# Patient Record
Sex: MALE | Race: OTHER | HISPANIC OR LATINO | Employment: FULL TIME | URBAN - METROPOLITAN AREA
[De-identification: names, ages, dates, MRNs, and addresses within clinical notes are randomized per-mention and may not be internally consistent; named-entity substitution may affect disease eponyms.]

---

## 2019-08-12 ENCOUNTER — HOSPITAL ENCOUNTER (EMERGENCY)
Facility: HOSPITAL | Age: 24
Discharge: HOME | End: 2019-08-12
Attending: EMERGENCY MEDICINE
Payer: COMMERCIAL

## 2019-08-12 ENCOUNTER — APPOINTMENT (EMERGENCY)
Dept: RADIOLOGY | Facility: HOSPITAL | Age: 24
End: 2019-08-12
Payer: COMMERCIAL

## 2019-08-12 VITALS
WEIGHT: 251.77 LBS | SYSTOLIC BLOOD PRESSURE: 147 MMHG | HEART RATE: 97 BPM | DIASTOLIC BLOOD PRESSURE: 77 MMHG | TEMPERATURE: 98.7 F | RESPIRATION RATE: 20 BRPM | OXYGEN SATURATION: 99 %

## 2019-08-12 DIAGNOSIS — S61.419A LACERATION OF HAND: Primary | ICD-10-CM

## 2019-08-12 PROCEDURE — 90471 IMMUNIZATION ADMIN: CPT

## 2019-08-12 PROCEDURE — 99284 EMERGENCY DEPT VISIT MOD MDM: CPT | Performed by: PHYSICIAN ASSISTANT

## 2019-08-12 PROCEDURE — 13132 CMPLX RPR F/C/C/M/N/AX/G/H/F: CPT | Performed by: PHYSICIAN ASSISTANT

## 2019-08-12 PROCEDURE — 90715 TDAP VACCINE 7 YRS/> IM: CPT | Performed by: PHYSICIAN ASSISTANT

## 2019-08-12 PROCEDURE — 73130 X-RAY EXAM OF HAND: CPT

## 2019-08-12 PROCEDURE — 99283 EMERGENCY DEPT VISIT LOW MDM: CPT

## 2019-08-12 PROCEDURE — 12002 RPR S/N/AX/GEN/TRNK2.6-7.5CM: CPT | Performed by: PHYSICIAN ASSISTANT

## 2019-08-12 RX ORDER — GINSENG 100 MG
1 CAPSULE ORAL ONCE
Status: COMPLETED | OUTPATIENT
Start: 2019-08-12 | End: 2019-08-12

## 2019-08-12 RX ORDER — AMOXICILLIN AND CLAVULANATE POTASSIUM 875; 125 MG/1; MG/1
1 TABLET, FILM COATED ORAL ONCE
Status: COMPLETED | OUTPATIENT
Start: 2019-08-12 | End: 2019-08-12

## 2019-08-12 RX ORDER — LIDOCAINE HYDROCHLORIDE 20 MG/ML
10 INJECTION, SOLUTION EPIDURAL; INFILTRATION; INTRACAUDAL; PERINEURAL ONCE
Status: COMPLETED | OUTPATIENT
Start: 2019-08-12 | End: 2019-08-12

## 2019-08-12 RX ORDER — AMOXICILLIN AND CLAVULANATE POTASSIUM 875; 125 MG/1; MG/1
1 TABLET, FILM COATED ORAL EVERY 12 HOURS SCHEDULED
Qty: 14 TABLET | Refills: 0 | Status: SHIPPED | OUTPATIENT
Start: 2019-08-12 | End: 2019-08-19

## 2019-08-12 RX ADMIN — BACITRACIN 1 LARGE APPLICATION: 500 OINTMENT TOPICAL at 21:49

## 2019-08-12 RX ADMIN — LIDOCAINE HYDROCHLORIDE 10 ML: 20 INJECTION, SOLUTION EPIDURAL; INFILTRATION; INTRACAUDAL; PERINEURAL at 19:59

## 2019-08-12 RX ADMIN — TETANUS TOXOID, REDUCED DIPHTHERIA TOXOID AND ACELLULAR PERTUSSIS VACCINE, ADSORBED 0.5 ML: 5; 2.5; 8; 8; 2.5 SUSPENSION INTRAMUSCULAR at 20:00

## 2019-08-12 RX ADMIN — AMOXICILLIN AND CLAVULANATE POTASSIUM 1 TABLET: 875; 125 TABLET, FILM COATED ORAL at 22:00

## 2019-08-13 NOTE — ED PROVIDER NOTES
History  Chief Complaint   Patient presents with    Hand Laceration     R hand laceration  This is a 24 y/o M with no known PMHx who presents today s/p injury with sustained multiple lacerations to his R hand  The patient reports he was angry and smashed a glass cup  He reports he is not sure if he is up to date with his tetanus shot  He has no known allergies  He reports he has trouble gripping with his 3rd and 4th phalanx and trouble flexing with these fingers  He has no decreased sensation  No cyanosis on exam        History provided by:  Patient  Laceration   Location:  Hand  Hand laceration location:  R hand and R fingers  Length:  4cm laceration to his 3rd proximal phalanx, 3cm laceration to his 2nd proximal phalanx, and an avulsion laceration to 4th phalanx with multiple small abrasions  Depth: Through underlying tissue  Quality: avulsion and straight    Bleeding: arterial and controlled    Time since incident:  2 hours  Laceration mechanism:  Broken glass  Pain details:     Quality:  Aching    Severity:  Mild    Timing:  Constant    Progression:  Worsening  Foreign body present:  No foreign bodies  Relieved by:  None tried  Worsened by: Movement and pressure  Ineffective treatments:  None tried  Tetanus status:  Out of date  Associated symptoms: focal weakness    Associated symptoms: no fever, no numbness, no rash, no redness, no swelling and no streaking        None       History reviewed  No pertinent past medical history  History reviewed  No pertinent surgical history  History reviewed  No pertinent family history  I have reviewed and agree with the history as documented  Social History     Tobacco Use    Smoking status: Current Every Day Smoker    Smokeless tobacco: Never Used   Substance Use Topics    Alcohol use: Yes     Comment: social    Drug use: No        Review of Systems   Constitutional: Negative  Negative for fever  HENT: Negative  Eyes: Negative      Respiratory: Negative  Cardiovascular: Negative  Gastrointestinal: Negative  Endocrine: Negative  Genitourinary: Negative  Musculoskeletal: Negative  Skin: Positive for wound  Negative for rash  Allergic/Immunologic: Negative  Neurological: Positive for focal weakness  Hematological: Negative  Psychiatric/Behavioral: Negative  Physical Exam  Physical Exam   Constitutional: He appears well-developed and well-nourished  Cardiovascular: Normal rate and regular rhythm  Pulmonary/Chest: Effort normal and breath sounds normal    Musculoskeletal:        Hands:  Noted deep 4cm laceration of 3rd proximal phalanx with superficial arterial bleeding present  Noted decreased ability to flex finger on exam  No cyanosis or distal neuropathy present on exam    4th distal phalanx 3cm avulsion laceration  Noted decreased flexion present of finger  No distal cyanosis or neuropathy    Noted 2nd proximal phalanx 3cm straight laceration  No distal neuropathy  Noted normal strength  No distal cyanosis      Multiple superficial abrasions present on palm       Vital Signs  ED Triage Vitals   Temperature Pulse Respirations Blood Pressure SpO2   08/12/19 1935 08/12/19 1930 08/12/19 1930 08/12/19 1930 08/12/19 1930   98 7 °F (37 1 °C) 97 20 147/77 99 %      Temp Source Heart Rate Source Patient Position - Orthostatic VS BP Location FiO2 (%)   08/12/19 1935 08/12/19 1930 08/12/19 1930 08/12/19 1930 --   Tympanic Monitor Sitting Left arm       Pain Score       08/12/19 1940       9           Vitals:    08/12/19 1930   BP: 147/77   Pulse: 97   Patient Position - Orthostatic VS: Sitting         Visual Acuity      ED Medications  Medications   tetanus-diphtheria-acellular pertussis (BOOSTRIX) IM injection 0 5 mL (0 5 mL Intramuscular Given 8/12/19 2000)   lidocaine (PF) (XYLOCAINE-MPF) 2 % injection 10 mL (10 mL Infiltration Given 8/12/19 1959)   bacitracin topical ointment 1 large application (1 large application Topical Given 8/12/19 2147)   amoxicillin-clavulanate (AUGMENTIN) 875-125 mg per tablet 1 tablet (1 tablet Oral Given 8/12/19 2200)       Diagnostic Studies  Results Reviewed     None                 XR hand 3+ views RIGHT   ED Interpretation by Nohemi Loo PA-C (08/12 2017)   No radio opaque FB present on XR      Final Result by Keagan Eller MD (08/13 7343)      No acute osseous abnormality  Workstation performed: [id number]                    Procedures  Laceration repair  Date/Time: 8/12/2019 8:00 PM  Performed by: Nohemi Loo PA-C  Authorized by: Nohemi Loo PA-C   Consent: Verbal consent obtained  Written consent not obtained  Consent given by: patient  Patient understanding: patient states understanding of the procedure being performed  Patient consent: the patient's understanding of the procedure matches consent given  Patient identity confirmed: verbally with patient  Body area: upper extremity  Location details: right index finger  Laceration length: 3 cm  Foreign bodies: no foreign bodies  Tendon involvement: none  Nerve involvement: none  Vascular damage: no  Anesthesia: digital block    Anesthesia:  Local Anesthetic: lidocaine 2% without epinephrine  Anesthetic total: 2 mL    Sedation:  Patient sedated: no      Wound Dehiscence:  Superficial Wound Dehiscence: simple closure      Procedure Details:  Irrigation solution: saline  Irrigation method: jet lavage  Debridement: none  Skin closure: Ethilon  Number of sutures: 2  Technique: simple  Approximation difficulty: simple  Dressing: antibiotic ointment    Laceration repair  Date/Time: 8/12/2019 8:00 PM  Performed by: Nohemi Loo PA-C  Authorized by: Nohemi Loo PA-C   Consent: Verbal consent obtained    Consent given by: patient  Patient understanding: patient states understanding of the procedure being performed  Patient consent: the patient's understanding of the procedure matches consent given  Patient identity confirmed: verbally with patient  Body area: upper extremity  Location details: right long finger  Laceration length: 4 cm  Foreign bodies: no foreign bodies  Tendon involvement: superficial  Nerve involvement: none  Vascular damage: yes  Anesthesia: local infiltration    Anesthesia:  Local Anesthetic: lidocaine 2% without epinephrine  Anesthetic total: 5 mL    Sedation:  Patient sedated: no      Wound Dehiscence:  Superficial Wound Dehiscence: simple closure      Procedure Details:  Irrigation solution: saline  Irrigation method: jet lavage  Amount of cleaning: standard  Debridement: none  Skin closure: Ethilon  Number of sutures: 8  Technique: simple  Approximation: close  Approximation difficulty: complex  Dressing: antibiotic ointment    Laceration repair  Date/Time: 8/12/2019 8:00 PM  Performed by: Ilia Garcia PA-C  Authorized by: Ilia Garcia PA-C   Consent: Verbal consent obtained    Consent given by: patient  Patient understanding: patient states understanding of the procedure being performed  Patient consent: the patient's understanding of the procedure matches consent given  Patient identity confirmed: verbally with patient  Body area: upper extremity  Location details: right ring finger  Laceration length: 3 cm  Foreign bodies: no foreign bodies  Tendon involvement: complex  Nerve involvement: complex  Vascular damage: no  Anesthesia: digital block    Anesthesia:  Local Anesthetic: lidocaine 2% without epinephrine  Anesthetic total: 2 mL    Sedation:  Patient sedated: no      Wound Dehiscence:  Superficial Wound Dehiscence: simple closure      Procedure Details:  Irrigation solution: saline  Amount of cleaning: standard  Debridement: none  Degree of undermining: none  Skin closure: Ethilon  Number of sutures: 3  Technique: simple  Approximation: close  Approximation difficulty: simple  Dressing: antibiotic ointment and 4x4 sterile gauze             ED Course MDM  Number of Diagnoses or Management Options  Laceration of hand:   Diagnosis management comments: This is a 26 y/o M who presents today with sustained multiple lacerations on his R hand  His XR was negative for any FB or fracture  He underwent laceration repair and tolerated procedure well - total of 13 sutures placed to 3 wounds  Wounds were irrigated and cleaned with betadine  He had multiple superficial abrasions which were treated with antibiotic ointment and dressed with the 3 lacerations  He was given augmentin to take for home  Given his decreased ability to flex his 3rd and 4th phalanx, Dr Angela Shultz was called  He recommended the patient report to his office at noon the following day  I stressed this to the patient  He understood -  was used during the entirety of the visit  Patient was told sutures should be removed in 7 days  Disposition  Final diagnoses:   Laceration of hand     Time reflects when diagnosis was documented in both MDM as applicable and the Disposition within this note     Time User Action Codes Description Comment    8/12/2019  9:58 PM Cheo Bhagat Add [U37 736C] Laceration of hand       ED Disposition     ED Disposition Condition Date/Time Comment    Discharge Stable Mon Aug 12, 2019  9:50 PM Paula Smallwood discharge to home/self care  Follow-up Information     Follow up With Specialties Details Why [address], MD Plastic Surgery  Please go to this address at noon tomorrow [address] [place] [address]  [phone]            Discharge Medication List as of 8/12/2019  9:59 PM      START taking these medications    Details   amoxicillin-clavulanate (AUGMENTIN) 875-125 mg per tablet Take 1 tablet by mouth every 12 (twelve) hours for 7 days, Starting Mon 8/12/2019, Until Mon 8/19/2019, Print           No discharge procedures on file      ED Provider  Electronically Signed by           Roderick Crawley Yane Gibbons PA-C  08/13/19 [address] [facility] Yane Gibbons PA-C  08/13/19 0186

## 2019-08-17 ENCOUNTER — TRANSCRIBE ORDERS (OUTPATIENT)
Dept: ADMINISTRATIVE | Facility: HOSPITAL | Age: 24
End: 2019-08-17

## 2019-08-17 ENCOUNTER — APPOINTMENT (OUTPATIENT)
Dept: LAB | Facility: HOSPITAL | Age: 24
End: 2019-08-17
Attending: PLASTIC SURGERY
Payer: COMMERCIAL

## 2019-08-17 DIAGNOSIS — S66.195A OTHER INJURY OF FLEXOR MUSCLE, FASCIA AND TENDON OF LEFT RING FINGER AT WRIST AND HAND LEVEL, INITIAL ENCOUNTER: Primary | ICD-10-CM

## 2019-08-17 DIAGNOSIS — S66.195A OTHER INJURY OF FLEXOR MUSCLE, FASCIA AND TENDON OF LEFT RING FINGER AT WRIST AND HAND LEVEL, INITIAL ENCOUNTER: ICD-10-CM

## 2019-08-17 LAB
ANION GAP SERPL CALCULATED.3IONS-SCNC: 10 MMOL/L (ref 4–13)
BASOPHILS # BLD AUTO: 0.04 THOUSANDS/ΜL (ref 0–0.1)
BASOPHILS NFR BLD AUTO: 1 % (ref 0–1)
BUN SERPL-MCNC: 11 MG/DL (ref 5–25)
CALCIUM SERPL-MCNC: 9.5 MG/DL (ref 8.3–10.1)
CHLORIDE SERPL-SCNC: 103 MMOL/L (ref 100–108)
CO2 SERPL-SCNC: 26 MMOL/L (ref 21–32)
CREAT SERPL-MCNC: 0.87 MG/DL (ref 0.6–1.3)
EOSINOPHIL # BLD AUTO: 0.09 THOUSAND/ΜL (ref 0–0.61)
EOSINOPHIL NFR BLD AUTO: 1 % (ref 0–6)
ERYTHROCYTE [DISTWIDTH] IN BLOOD BY AUTOMATED COUNT: 13.9 % (ref 11.6–15.1)
ERYTHROCYTE [SEDIMENTATION RATE] IN BLOOD: 7 MM/HOUR (ref 0–10)
GFR SERPL CREATININE-BSD FRML MDRD: 121 ML/MIN/1.73SQ M
GLUCOSE P FAST SERPL-MCNC: 93 MG/DL (ref 65–99)
HCT VFR BLD AUTO: 43.6 % (ref 36.5–49.3)
HGB BLD-MCNC: 13.4 G/DL (ref 12–17)
IMM GRANULOCYTES # BLD AUTO: 0.03 THOUSAND/UL (ref 0–0.2)
IMM GRANULOCYTES NFR BLD AUTO: 0 % (ref 0–2)
LYMPHOCYTES # BLD AUTO: 1.83 THOUSANDS/ΜL (ref 0.6–4.47)
LYMPHOCYTES NFR BLD AUTO: 27 % (ref 14–44)
MCH RBC QN AUTO: 25.7 PG (ref 26.8–34.3)
MCHC RBC AUTO-ENTMCNC: 30.7 G/DL (ref 31.4–37.4)
MCV RBC AUTO: 84 FL (ref 82–98)
MONOCYTES # BLD AUTO: 0.45 THOUSAND/ΜL (ref 0.17–1.22)
MONOCYTES NFR BLD AUTO: 7 % (ref 4–12)
NEUTROPHILS # BLD AUTO: 4.33 THOUSANDS/ΜL (ref 1.85–7.62)
NEUTS SEG NFR BLD AUTO: 64 % (ref 43–75)
NRBC BLD AUTO-RTO: 0 /100 WBCS
PLATELET # BLD AUTO: 213 THOUSANDS/UL (ref 149–390)
PMV BLD AUTO: 11.6 FL (ref 8.9–12.7)
POTASSIUM SERPL-SCNC: 4.3 MMOL/L (ref 3.5–5.3)
RBC # BLD AUTO: 5.21 MILLION/UL (ref 3.88–5.62)
SODIUM SERPL-SCNC: 139 MMOL/L (ref 136–145)
WBC # BLD AUTO: 6.77 THOUSAND/UL (ref 4.31–10.16)

## 2019-08-17 PROCEDURE — 85652 RBC SED RATE AUTOMATED: CPT

## 2019-08-17 PROCEDURE — 80048 BASIC METABOLIC PNL TOTAL CA: CPT

## 2019-08-17 PROCEDURE — 36415 COLL VENOUS BLD VENIPUNCTURE: CPT

## 2019-08-17 PROCEDURE — 85025 COMPLETE CBC W/AUTO DIFF WBC: CPT

## 2019-08-20 ENCOUNTER — HOSPITAL ENCOUNTER (OUTPATIENT)
Dept: RADIOLOGY | Facility: HOSPITAL | Age: 24
Discharge: HOME | End: 2019-08-20
Payer: COMMERCIAL

## 2019-08-20 ENCOUNTER — OFFICE VISIT (OUTPATIENT)
Dept: FAMILY MEDICINE CLINIC | Facility: CLINIC | Age: 24
End: 2019-08-20
Payer: COMMERCIAL

## 2019-08-20 ENCOUNTER — ANESTHESIA EVENT (OUTPATIENT)
Dept: PERIOP | Facility: HOSPITAL | Age: 24
End: 2019-08-20
Payer: COMMERCIAL

## 2019-08-20 ENCOUNTER — APPOINTMENT (OUTPATIENT)
Dept: LAB | Facility: HOSPITAL | Age: 24
End: 2019-08-20
Payer: COMMERCIAL

## 2019-08-20 VITALS
HEART RATE: 73 BPM | SYSTOLIC BLOOD PRESSURE: 130 MMHG | DIASTOLIC BLOOD PRESSURE: 80 MMHG | RESPIRATION RATE: 20 BRPM | BODY MASS INDEX: 36.65 KG/M2 | OXYGEN SATURATION: 98 % | WEIGHT: 256 LBS | HEIGHT: 70 IN | TEMPERATURE: 97.3 F

## 2019-08-20 DIAGNOSIS — Z11.3 SCREENING FOR STD (SEXUALLY TRANSMITTED DISEASE): ICD-10-CM

## 2019-08-20 DIAGNOSIS — M25.562 ACUTE PAIN OF LEFT KNEE: ICD-10-CM

## 2019-08-20 DIAGNOSIS — R73.9 HYPERGLYCEMIA: ICD-10-CM

## 2019-08-20 DIAGNOSIS — E66.9 OBESITY (BMI 35.0-39.9 WITHOUT COMORBIDITY): ICD-10-CM

## 2019-08-20 DIAGNOSIS — Z13.220 SCREENING FOR CHOLESTEROL LEVEL: ICD-10-CM

## 2019-08-20 DIAGNOSIS — R51.9 ACUTE NONINTRACTABLE HEADACHE, UNSPECIFIED HEADACHE TYPE: Primary | ICD-10-CM

## 2019-08-20 DIAGNOSIS — Z00.01 ENCOUNTER FOR WELL ADULT EXAM WITH ABNORMAL FINDINGS: ICD-10-CM

## 2019-08-20 DIAGNOSIS — L24.9 IRRITANT CONTACT DERMATITIS, UNSPECIFIED TRIGGER: ICD-10-CM

## 2019-08-20 LAB
ALBUMIN SERPL BCP-MCNC: 4.5 G/DL (ref 3–5.2)
ALP SERPL-CCNC: 109 U/L (ref 43–122)
ALT SERPL W P-5'-P-CCNC: 44 U/L (ref 9–52)
ANION GAP SERPL CALCULATED.3IONS-SCNC: 10 MMOL/L (ref 5–14)
AST SERPL W P-5'-P-CCNC: 32 U/L (ref 17–59)
BILIRUB SERPL-MCNC: 0.2 MG/DL
BUN SERPL-MCNC: 10 MG/DL (ref 5–25)
CALCIUM SERPL-MCNC: 9.9 MG/DL (ref 8.4–10.2)
CHLORIDE SERPL-SCNC: 102 MMOL/L (ref 97–108)
CHOLEST SERPL-MCNC: 189 MG/DL
CO2 SERPL-SCNC: 29 MMOL/L (ref 22–30)
CREAT SERPL-MCNC: 0.81 MG/DL (ref 0.7–1.5)
EST. AVERAGE GLUCOSE BLD GHB EST-MCNC: 100 MG/DL
GFR SERPL CREATININE-BSD FRML MDRD: 124 ML/MIN/1.73SQ M
GLUCOSE P FAST SERPL-MCNC: 85 MG/DL (ref 70–99)
HBA1C MFR BLD: 5.1 % (ref 4.2–6.3)
HDLC SERPL-MCNC: 41 MG/DL (ref 40–59)
LDLC SERPL CALC-MCNC: 118 MG/DL
NONHDLC SERPL-MCNC: 148 MG/DL
POTASSIUM SERPL-SCNC: 4 MMOL/L (ref 3.6–5)
PROT SERPL-MCNC: 7.9 G/DL (ref 5.9–8.4)
SODIUM SERPL-SCNC: 141 MMOL/L (ref 137–147)
TRIGL SERPL-MCNC: 151 MG/DL
TSH SERPL DL<=0.05 MIU/L-ACNC: 2.37 UIU/ML (ref 0.47–4.68)

## 2019-08-20 PROCEDURE — 80061 LIPID PANEL: CPT

## 2019-08-20 PROCEDURE — 86592 SYPHILIS TEST NON-TREP QUAL: CPT

## 2019-08-20 PROCEDURE — 3008F BODY MASS INDEX DOCD: CPT | Performed by: NURSE PRACTITIONER

## 2019-08-20 PROCEDURE — 87491 CHLMYD TRACH DNA AMP PROBE: CPT

## 2019-08-20 PROCEDURE — 99204 OFFICE O/P NEW MOD 45 MIN: CPT | Performed by: NURSE PRACTITIONER

## 2019-08-20 PROCEDURE — 84443 ASSAY THYROID STIM HORMONE: CPT

## 2019-08-20 PROCEDURE — 87591 N.GONORRHOEAE DNA AMP PROB: CPT

## 2019-08-20 PROCEDURE — 73562 X-RAY EXAM OF KNEE 3: CPT

## 2019-08-20 PROCEDURE — 80053 COMPREHEN METABOLIC PANEL: CPT

## 2019-08-20 PROCEDURE — 87389 HIV-1 AG W/HIV-1&-2 AB AG IA: CPT

## 2019-08-20 PROCEDURE — 36415 COLL VENOUS BLD VENIPUNCTURE: CPT

## 2019-08-20 PROCEDURE — 83036 HEMOGLOBIN GLYCOSYLATED A1C: CPT

## 2019-08-20 RX ORDER — TRIAMCINOLONE ACETONIDE 1 MG/G
CREAM TOPICAL 2 TIMES DAILY
Qty: 30 G | Refills: 0 | Status: SHIPPED | OUTPATIENT
Start: 2019-08-20 | End: 2021-02-03 | Stop reason: ALTCHOICE

## 2019-08-20 RX ORDER — NAPROXEN 500 MG/1
500 TABLET ORAL 2 TIMES DAILY WITH MEALS
Qty: 60 TABLET | Refills: 0 | Status: SHIPPED | OUTPATIENT
Start: 2019-08-20 | End: 2021-02-03 | Stop reason: ALTCHOICE

## 2019-08-20 NOTE — ASSESSMENT & PLAN NOTE
Pt recommended continue use of Tylenol or Motrin to help decrease pain  Pt to continue keeping active and performing some light exercises such as walking and strengthening muscles  Advised to avoid heavy lifting and kneeling on knee and use Ice to affected knee  If symptoms do not improve within 4 to 6 weeks pt advised to start return to clinic for possible knee injections and possibly start Physical therapy

## 2019-08-20 NOTE — PATIENT INSTRUCTIONS
Dermatitis por contacto   LO QUE NECESITA SABER:   ¿Qué es la dermatitis por contacto? La dermatitis de contacto es un sarpullido  Se desarrolla cuando usted toca algo que irrita valdivia piel o que provoca christi reacción alérgica  ¿Qué provoca la dermatitis por contacto? Los siguientes son irritantes o alérgenos comúnes que provocan la dermatitis por contacto:  · Jabones, lociones o maquillaje    · Susy Le, michael los que se encuentran en los productos de limpieza    · La orina o las evacuaciones intestinales    · El papel elliot, el los o la germán    · Las plantas, michael la hiedra venenosa o el tawnya venenoso    · Ciertos metales michael el cromo o níquel    · El caucho o el látex    · Ciertos medicamentos de uso tópico michael antibióticos o cremas esteroides  ¿Cuáles son los signos y síntomas de la dermatitis por contacto? · Salpullido monsalve, inflamado, doloroso           · Comezón, escozor o ardor en la piel    · Parches en la piel secos, escamosos o con costras    · Protuberancias o ampollas    · Líquido que drena de las ampollas  ¿Cómo se diagnostica la dermatitis por contacto? Valdivia médico puede diagnosticar valdivia sarpullido al [employer]  Le preguntará cuándo empezaron dariela signos y síntomas  Dígale cuánto tiempo cardenas durado y lo que los provoca  Es probable que [employer] pregunte si usted ha sido expuesto a algún químico, producto o medicamento de uso tópico nuevo  ¿Cómo se trata la dermatitis por contacto? El mejor tratamiento es remover los irritantes o alérgenos que provocan valdivia salpullido  Usted también podría necesitar medicamentos para disminuir la comezón y la inflamación  Los medicamentos serán de uso tópico para aplicarse sobre valdivia salpullido o en píldora  ¿Cómo puedo controlar los síntomas? · [address] cortas en agua fría  Use jabón suave o algún limpiador que no tenga jabón   Agregue yovanny, bicarbonato de sodio o harina de maíz al agua de la rah para ayudar a disminuir la irritación en la piel     · Evite irritantes de la piel , michael maquillaje, productos para el radha, jabones y limpiadores  Use productos que no contengan perfume o tintes  · Aplique christi compresa fría a sams sarpullido  [place] ayudará a aliviar sams piel  · Mantenga sams piel húmeda  Frote crema o loción sin perfume en sams piel para impedir la resequedad y comezón  Olman esto tan pronto termine de bañarse o ducharse cuando sams piel aún esté mojada  Llame al 911 en mariela de presentar lo siguiente:   · Usted tiene dificultad repentina para respirar  · Sams garganta se inflama y tiene dificultad para comer  · Sams erin está inflamada  ¿Cuándo donna comunicarme con mi médico?   · Usted tiene fiebre  · Dariela ampollas están drenando pus  · Sams sarpullido se propaga o no mejora aún después del tratamiento  · Usted tiene preguntas o inquietudes acerca de sams condición o cuidado  ACUERDOS SOBRE SAMS CUIDADO:   Usted tiene el derecho de ayudar a planear sams cuidado  Aprenda todo lo que pueda sobre sams condición y michael darle tratamiento  Discuta dariela opciones de tratamiento con dariela médicos para decidir el cuidado que usted desea recibir  Usted siempre tiene el derecho de rechazar el tratamiento  Esta información es sólo para uso en educación  Sams intención no es darle un consejo médico sobre enfermedades o tratamientos  Colsulte con sams Silvia Boerafia farmacéutico antes de seguir cualquier régimen médico para saber si es seguro y efectivo para usted  © 2017 [address] Information is for End User's use only and may not be sold, redistributed or otherwise used for commercial purposes  All illustrations and images included in CareNotes® are the copyrighted property of A D A M , Inc  or Candelario Guzman  Migraña   LO QUE NECESITA SABER:   ¿Qué es christi Borden Men? Karen Kent es un dolor de vivian intenso  El dolor puede ser tan severo que interfiere con dariela actividades cotidianas   Karen Kent puede durar desde pocas horas hasta varios días  La causa exacta de la migraña no es conocida  ¿Qué puede desencadenar christi Lino Meigs? · El estrés, fatiga de los ojos, dormir demasiado o no dormir lo suficiente    · [employer] debido a las píldoras anticonceptivas, embarazo, menopausia o cynthia la menstruación    · Omitir comidas, pasar mucho tiempo sin comer o no cecille suficientes líquidos    · Ciertos alimentos o bebidas michael el chocolate, el queso elliot, vino tinto o bebidas que contienen cafeína    · Alimentos que contienen gluten, nitratos, glutamato monosódico o endulzantes artificiales    · [employer], luces brillantes o luces parpadeantes, ruidos tasha, humo u olores tasha    · Calor, humedad o cambios en el clima  ¿Cuáles son las señales de advertencia de que christi migraña está por comenzar? Los signos de advertencia generalmente comienzan de 15 a 60 minutos antes del dolor de vivian:  · Cambios visuales (auras), michael visión borrosa, ceguera temporal o manchas brillantes, líneas o alucinaciones    · Cansancio anormal o bostezos frecuentes    · Hormigueo en elise brazo o pierna  ¿Cuáles son los signos y síntomas de Naomia Little migraña? La migraña comienza generalmente con un dolor monótono alrededor del kirstin o la sien  El dolor podría empeorar con el movimiento  Usted también podría tener lo siguiente:  · Dolor en elise vivian que podría aumentar hasta el punto que usted no es capaz de realizar dariela actividades cotidianas    · Dolor en hannah o ambos lados de elise vivian    · Dolor punzante, pulsante o marybeth en la vivian    · Náuseas y vómitos    · Sensibilidad a la jovon, el ruido o los olores  ¿Cómo se diagnostica christi Lino Meigs? Elise médico le hará preguntas acerca de dariela ariana de [place]  Elliott Highman dolor y otros síntomas, michael náuseas  Infórmele al médico si usted reba que hubo algún desencadenante del dolor  El médico también querrá saber qué fue usted comió y tomó antes que le empezara el dolor   Infórmele al [employer] cualquier afección médica que usted tenga o sea característica de elise anu  Incluya cualquier estresor reciente que ha tenido  Es posible que también necesite alguno de los siguientes tratamientos:  · Un examen neurológico  se [place] para revisar cómo reaccionan las pupilas a la jovon  Elise médico podría revisar elise memoria, la forma en que agarra las cosas con elise mano y el equilibrio  · Las imágenes por tomografía computarizada o por resonancia magnética  podrían cecille imágenes de elise cerebro  Es posible que le administren un medio de contraste que sirve para que el cerebro se observe con mayor claridad en las imágenes  Dígale al médico si usted alguna vez ha tenido christi reacción alérgica al líquido de [place]  No entre a la regina donde se realiza la resonancia magnética con algo de metal  El metal puede causar lesiones serias  Dígale al médico si usted tiene algo de metal por dentro o sobre elsie cuerpo  ¿Cómo se trata Pili Shove? Las migrañas no [place]  La meta del tratamiento es reducir dariela síntomas  [place] medicamento tan pronto michael sienta que le comienza Pili Shove  · Un medicamento con receta para el dolor  podrían ser Pablo Clutter  No espere a que el dolor sea muy intenso para cecille el medicamento  · Medicamentos para la migraña  se [place] para evitar christi migraña o detenerla christi vez que comience  · Los medicamentos contra las náuseas  pueden darse para calmar elise estómago y ayudarle a prevenir los vómitos  Annika medicamento también puede aliviar el dolor  ¿Qué puedo hacer para manejar mis síntomas? · Repose en christi habitación oscura y [place]  [place] ayudará a disminuir el dolor  El dormir también podría ayudarlo a aliviar elise dolor  · Aplique hielo para reducir el dolor  Use un paquete de hielo o ponga hielo molido dentro de [employer]  Cubra el paquete de hielo con christi toalla y colóqueselo en la vivian  Aplique hielo cynthia 15 a 20 minutos cada hora      · Aplique calor para disminuir el dolor y los espasmos musculares  Utilice christi toalla pequeña empapada con [place], christi almohada térmica o tome un baño de rah con agua tibia  Aplique la compresa caliente sobre el área por 20 a 30 minutos cada 2 horas  Usted puede alternar el calor y el hielo  · Mantenga un registro de las migrañas  Escriba cuándo comienzan y terminan dariela migrañas  Kathleen Bran y [place] haciendo cuando comenzó Muse  Registre lo que comió y lo que tomó las 24 horas antes de que comenzó elise migraña  Mantenga un registro de lo que hizo para tratar elise migraña y si funcionó  Hyla Maya el registro de las migrañas con usted a las citas con elise médico   ¿Qué puedo hacer para evitar otra migraña? · No fume  La nicotina y otras sustancias químicas en los cigarrillos y puros pueden desencadenar christi Teretha Staggers  Pida información a elise médico si usted actualmente fuma y necesita ayuda para dejar de fumar  Los cigarrillos electrónicos o tabaco sin humo todavía contienen nicotina  Consulte con elise médico antes de [employer]  · No consuma alcohol  El alcohol puede provocar migraña  También puede impedir que [employer] medicamentos para la migraña  · Ejercítese regularmente  El ejercicio puede ayudar a evitar migrañas  Consulte con elise médico acerca de cuál es el mejor régimen de ejercicio para usted  Trate de hacer unos 30 minutos de ejercicio todos los días que pueda  · Controle el estrés  El estrés podría provocar migraña  Aprenda nuevas maneras de relajarse michael la respiración profunda  · Establezca un horario para dormir  Acuéstese y levántese a la misma hora cada día  No deana televisión inmediatamente antes de acostarse  · Coma dariela comidas regularmente  Incluya alimentos [employer] fruta, verduras, panes de grano entero, productos lácteos bajos en grasa, frijoles, carne magra y pescado   No consuma alimentos o bebidas que puedan desencadenar dariela migrañas  ¿Cuándo donna buscar atención inmediata? · Usted tiene dolor de vivian que parece ser diferente o mucho peor que sams migraña habitual     · Usted tiene un dolor de vivian severo con fiebre o rigidez en el renard  · Usted tiene nuevos problemas con el habla, la visión, el equilibrio o el [address]  · Usted siente que se va a desmayar, se siente confundido o sufre christi convulsión  ¿Cuándo donna comunicarme con mi médico?   · Sams migraña interfiere con xiomara actividades cotidianas  · Xiom[place] medicamentos o tratamientos rubi de funcionar  · Usted tiene preguntas o inquietudes acerca de sams condición o cuidado  ACUERDOS SOBRE SAMS CUIDADO:   Usted tiene el derecho de ayudar a planear sams cuidado  Aprenda todo lo que pueda sobre sams condición y michael darle tratamiento  Discuta xiomara opciones de tratamiento con xiomara médicos para decidir el cuidado que usted desea recibir  Usted siempre tiene el derecho de rechazar el tratamiento  Esta información es sólo para uso en educación  Sams intención no es darle un consejo médico sobre enfermedades o tratamientos  Colsulte con sams [place] farmacéutico antes de seguir cualquier régimen médico para saber si es seguro y efectivo para usted  © 2017 [address] Information is for End User's use only and may not be sold, redistributed or otherwise used for commercial purposes  All illustrations and images included in CareNotes® are the copyrighted property of A D A M , Inc  or Candelario akins   LO QUE NECESITA SABER:   El dolor de rodana puede empezar de forma repentina, o ser un problema a Mace Raisin  Puede sentir dolor en la parte lateral, delantera o trasera de la rodilla  Puede tener la rodilla rígida e hinchada  Puede oír sonidos de chasquido o sentir que la rodilla cede o se le bloquea al andar  Puede sentir dolor cuando se sienta, se pone de pie, camina o sube y baja escaleras   El dolor de [address] puede estar provocado por condiciones michael obesidad, inflamación, esguinces o desgarros de los ligamentos o los tendones  INSTRUCCIONES SOBRE EL [facility] HOSPITALARIA:   Randy Clem en 24 horas a christi catia de seguimiento con elise médico o michael se le indique:  Lavaun Macadamia necesite tratamientos de seguimiento, michael inyecciones de esteroides para reducir el dolor  Anote dariela preguntas para que se acuerde de hacerlas cynthia dariela visitas  Cuidados personales:   · Descanse  la rodilla para que se pueda curar  Limite las actividades que aumenten el dolor  · El hielo  puede reducir la inflamación  Envuelva hielo en christi neal y Jessica que le recomienden y con la frecuencia que le indiquen  · La compresión  con christi Duana Lyssa o christi venda puede ayudar a reducir la hinchazón  Use christi férula o venda solamente si sigue las instrucciones del mariela  · La elevación  ayuda a calmar el dolor y bajar la inflamación  Eleve la rodilla mientras esté sentado o acostado  Apoye la pierna sobre almohadones para mantener la rodilla por encima del corazón  Medicamentos:   · AINEs (Analgésicos antiinflamatorios no esteroides)  pueden disminuir la inflamación y el dolor o la fiebre  Annika medicamento esta disponible con o sin christi receta médica  Los AINEs pueden causar sangrado estomacal o problemas renales en ciertas personas  Si usted hadley un medicamento anticoagulante, siempre pregúntele a elise médico si los DARYA son seguros para usted  Siempre nely la etiqueta de annika medicamento y [place] instrucciones  · El acetaminofén  [place] el dolor y baja la fiebre  Está disponible sin receta médica  Pregunte cuánto cecille y cuándo  [id number]  El acetaminofén puede causar daño en el hígado cuando no se hadley de forma correcta  · [place] dariela medicamentos michael se le haya indicado  Consulte con elise médico si usted reba que elise medicamento no le está ayudando o si presenta efectos secundarios   Infórmele si es alérgico a cualquier medicamento  Mantenga christi lista actualizada de los [employer], las vitaminas y los productos herbales que hadley  Incluya los siguientes datos de los medicamentos: cantidad, frecuencia y motivo de administración  Traiga con usted la lista o los envases de la píldoras a dariela citas de seguimiento  Lleve la lista de los medicamentos con usted en mariela de christi emergencia  Ejercítese según indicaciones:  Es posible que deba consultar con un fisioterapeuta o hacer los ejercicios que le recomienden para mejorar la movilidad y reducir el dolor  Anel Maria Alejandra le aconsejen que camine, nade o monte en bicicleta  Siga valdivia plan de ejercicios exactamente del modo que le cardenas indicado para evitar más lesiones  Pregúntele a valdivia Yoni Gambler vitaminas y minerales son adecuados para usted  · Usted tiene preguntas o inquietudes acerca de valdivia condición o cuidado  Regrese a la regina de emergencias si:   · El dolor empeora, [address]  · No puede flexionar o enderezar la pierna completamente  · La hinchazón alrededor de la rodilla no disminuye a pesar del tratamiento  · La rodilla le duele y se nota caliente al tacto  © 2017 [address] Information is for End User's use only and may not be sold, redistributed or otherwise used for commercial purposes  All illustrations and images included in CareNotes® are the copyrighted property of A D A M , Inc  or Candelario Guzman  Esta información es sólo para uso en educación  Valdivia intención no es darle un consejo médico sobre enfermedades o tratamientos  Colsulte con valdivia [place] farmacéutico antes de seguir cualquier régimen médico para saber si es seguro y efectivo para usted  Obesity   AMBULATORY CARE:   Obesity  is when your body mass index (BMI) is greater than 30  Your healthcare provider will use your height and weight to measure your BMI    The risks of obesity include  many health problems, such as injuries or physical disability  You may need tests to check for the following:  · Diabetes     · High blood pressure or high cholesterol     · Heart disease     · Gallbladder or liver disease     · Cancer of the colon, breast, prostate, liver, or kidney     · Sleep apnea     · Arthritis or gout  Seek care immediately if:   · You have a severe headache, confusion, or difficulty speaking  · You have weakness on one side of your body  · You have chest pain, sweating, or shortness of breath  Contact your healthcare provider if:   · You have symptoms of gallbladder or liver disease, such as pain in your upper abdomen  · You have knee or hip pain and discomfort while walking  · You have symptoms of diabetes, such as intense hunger and thirst, and frequent urination  · You have symptoms of sleep apnea, such as snoring or daytime sleepiness  · You have questions or concerns about your condition or care  Treatment for obesity  focuses on helping you lose weight to improve your health  Even a small decrease in BMI can reduce the risk for many health problems  Your healthcare provider will help you set a weight-loss goal   · Lifestyle changes  are the first step in treating obesity  These include making healthy food choices and getting regular physical activity  Your healthcare provider may suggest a weight-loss program that involves coaching, education, and therapy  · Medicine  may help you lose weight when it is used with a healthy diet and physical activity  · Surgery  can help you lose weight if you are very obese and have other health problems  There are several types of weight-loss surgery  Ask your healthcare provider for more information  Be successful losing weight:   · Set small, realistic goals  An example of a small goal is to walk for 20 minutes 5 days a week  Anther goal is to lose 5% of your body weight  · Tell friends, family members, and coworkers about your goals  and ask for their support  Ask a friend to lose weight with you, or join a weight-loss support group  · Identify foods or triggers that may cause you to overeat , and find ways to avoid them  Remove tempting high-calorie foods from your home and workplace  Place a bowl of fresh fruit on your kitchen counter  If stress causes you to eat, then find other ways to cope with stress  · Keep a diary to track what you eat and drink  Also write down how many minutes of physical activity you do each day  Weigh yourself once a week and record it in your diary  Eating changes: You will need to eat 500 to 1,000 fewer calories each day than you currently eat to lose 1 to 2 pounds a week  The following changes will help you cut calories:  · Eat smaller portions  Use small plates, no larger than 9 inches in diameter  Fill your plate half full of fruits and vegetables  Measure your food using measuring cups until you know what a serving size looks like  · Eat 3 meals and 1 or 2 snacks each day  Plan your meals in advance  Malou Rondon and eat at home most of the time  Eat slowly  · Eat fruits and vegetables at every meal   They are low in calories and high in fiber, which makes you feel full  Do not add butter, margarine, or cream sauce to vegetables  Use herbs to season steamed vegetables  · Eat less fat and fewer fried foods  Eat more baked or grilled chicken and fish  These protein sources are lower in calories and fat than red meat  Limit fast food  Dress your salads with olive oil and vinegar instead of bottled dressing  · Limit the amount of sugar you eat  Do not drink sugary beverages  Limit alcohol  Activity changes:  Physical activity is good for your body in many ways  It helps you burn calories and build strong muscles  It decreases stress and depression, and improves your mood  It can also help you sleep better   Talk to your healthcare provider before you begin an exercise program   · Exercise for at least 30 minutes 5 days a week  Start slowly  Set aside time each day for physical activity that you enjoy and that is convenient for you  It is best to do both weight training and an activity that increases your heart rate, such as walking, bicycling, or swimming  · Find ways to be more active  Do yard work and housecleaning  Walk up the stairs instead of using elevators  Spend your leisure time going to events that require walking, such as outdoor festivals or fairs  This extra physical activity can help you lose weight and keep it off  Follow up with your healthcare provider as directed: You may need to meet with a dietitian  Write down your questions so you remember to ask them during your visits  © 2017 [address] Information is for End User's use only and may not be sold, redistributed or otherwise used for commercial purposes  All illustrations and images included in CareNotes® are the copyrighted property of A D A M , Inc  or Candelario Guzman  The above information is an  only  It is not intended as medical advice for individual conditions or treatments  Talk to your doctor, nurse or pharmacist before following any medical regimen to see if it is safe and effective for you  Weight Management   AMBULATORY CARE:   Why it is important to manage your weight:  Being overweight increases your risk of health conditions such as heart disease, high blood pressure, type 2 diabetes, and certain types of cancer  It can also increase your risk for osteoarthritis, sleep apnea, and other respiratory problems  Aim for a slow, steady weight loss  Even a small amount of weight loss can lower your risk of health problems  How to lose weight safely:  A safe and healthy way to lose weight is to eat fewer calories and get regular exercise  You can lose up about 1 pound a week by decreasing the number of calories you eat by 500 calories each day   You can decrease calories by eating smaller portion sizes or by cutting out high-calorie foods  Read labels to find out how many calories are in the foods you eat  You can also burn calories with exercise such as walking, swimming, or biking  You will be more likely to keep weight off if you make these changes part of your lifestyle  Healthy meal plan for weight management:  A healthy meal plan includes a variety of foods, contains fewer calories, and helps you stay healthy  A healthy meal plan includes the following:  · Eat whole-grain foods more often  A healthy meal plan should contain fiber  Fiber is the part of grains, fruits, and vegetables that is not broken down by your body  Whole-grain foods are healthy and provide extra fiber in your diet  Some examples of whole-grain foods are whole-wheat breads and pastas, oatmeal, brown rice, and bulgur  · Eat a variety of vegetables every day  Include dark, leafy greens such as spinach, kale, husam greens, and mustard greens  Eat yellow and orange vegetables such as carrots, sweet potatoes, and winter squash  · Eat a variety of fruits every day  Choose fresh or canned fruit (canned in its own juice or light syrup) instead of juice  Fruit juice has very little or no fiber  · Eat low-fat dairy foods  Drink fat-free (skim) milk or 1% milk  Eat fat-free yogurt and low-fat cottage cheese  Try low-fat cheeses such as mozzarella and other reduced-fat cheeses  · Choose meat and other protein foods that are low in fat  Choose beans or other legumes such as split peas or lentils  Choose fish, skinless poultry (chicken or turkey), or lean cuts of red meat (beef or pork)  Before you cook meat or poultry, cut off any visible fat  · Use less fat and oil  Try baking foods instead of frying them  Add less fat, such as margarine, sour cream, regular salad dressing and mayonnaise to foods  Eat fewer high-fat foods  Some examples of high-fat foods include french fries, doughnuts, ice cream, and cakes      · Eat fewer sweets  Limit foods and drinks that are high in sugar  This includes candy, cookies, regular soda, and sweetened drinks  Ways to decrease calories:   · Eat smaller portions  ¨ Use a small plate with smaller servings  ¨ Do not eat second helpings  ¨ When you eat at a restaurant, ask for a box and place half of your meal in the box before you eat  ¨ Share an entrée with someone else  · Replace high-calorie snacks with healthy, low-calorie snacks  ¨ Choose fresh fruit, vegetables, fat-free rice cakes, or air-popped popcorn instead of potato chips, nuts, or chocolate  ¨ Choose water or calorie-free drinks instead of soda or sweetened drinks  · Eat regular meals  Skipping meals can lead to overeating later in the day  Eat a healthy snack in place of a meal if you do not have time to eat a regular meal      · Do not shop for groceries when you are hungry  You may be more likely to make unhealthy food choices  Take a grocery list of healthy foods and shop after you have eaten  Exercise:  Exercise at least 30 minutes per day on most days of the week  Some examples of exercise include walking, biking, dancing, and swimming  You can also fit in more physical activity by taking the stairs instead of the elevator or parking farther away from stores  Ask your healthcare provider about the best exercise plan for you  Other things to consider as you try to lose weight:   · Be aware of situations that may give you the urge to overeat, such as eating while watching television  Find ways to avoid these situations  For example, read a book, go for a walk, or do crafts  · Meet with a weight loss support group or friends who are also trying to lose weight  This may help you stay motivated to continue working on your weight loss goals  © 2017 [employer] Information is for End User's use only and may not be sold, redistributed or otherwise used for commercial purposes   All illustrations and images included in CareNotes® are the copyrighted property of A D A M , Inc  or Candelario Guzman  The above information is an  only  It is not intended as medical advice for individual conditions or treatments  Talk to your doctor, nurse or pharmacist before following any medical regimen to see if it is safe and effective for you

## 2019-08-20 NOTE — ASSESSMENT & PLAN NOTE
Advised pt to avoid triggers like perfume smells, cigarette smell, as well as avoiding other triggers such as tyramine and nitrates and sugar  Recommended dietary management including regular well-balanced meals and drinking enough water  To use OTC NSAIDs or can use Naproxen 500mg BID as needed for headaches  To follow up in 1 month

## 2019-08-20 NOTE — PROGRESS NOTES
Assessment/Plan:    Acute nonintractable headache  Advised pt to avoid triggers like perfume smells, cigarette smell, as well as avoiding other triggers such as tyramine and nitrates and sugar  Recommended dietary management including regular well-balanced meals and drinking enough water  To use OTC NSAIDs or can use Naproxen 500mg BID as needed for headaches  To follow up in 1 month  Acute pain of left knee  Pt recommended continue use of Tylenol or Motrin to help decrease pain  Pt to continue keeping active and performing some light exercises such as walking and strengthening muscles  Advised to avoid heavy lifting and kneeling on knee and use Ice to affected knee  If symptoms do not improve within 4 to 6 weeks pt advised to start return to clinic for possible knee injections and possibly start Physical therapy  Irritant contact dermatitis  Discussed with patient to identify and avoid the precipitating allergen or irritant  Use of corticosteroids is first line treatment to affected area twice daily  Palliative interventions such as cool compresses or oatmeal bath to reduce symptoms  Use of calamine or Burows solution can also help to dry rash  To follow up if s/s do not improve after 2 weeks of use  -To trial triamcinolone 0 1% cream x 1 month  BMI Counseling: Body mass index is 36 73 kg/m²  Discussed the patient's BMI with him  The BMI is above average  BMI counseling and education was provided to the patient  Nutrition recommendations include reducing portion sizes, 3-5 servings of fruits/vegetables daily, consuming healthier snacks, moderation in carbohydrate intake, reducing intake of saturated fat and trans fat and reducing intake of cholesterol  Exercise recommendations include moderate aerobic physical activity for 150 minutes/week         Diagnoses and all orders for this visit:    Acute nonintractable headache, unspecified headache type    Irritant contact dermatitis, unspecified trigger  - triamcinolone (KENALOG) 0 1 % cream; Apply topically 2 (two) times a day    Acute pain of left knee  -     XR knee 3 vw left non injury; Future  -     naproxen (NAPROSYN) 500 mg tablet; Take 1 tablet (500 mg total) by mouth 2 (two) times a day with meals    Screening for STD (sexually transmitted disease)  -     RPR; Future  -     Chlamydia/GC amplified DNA by PCR; Future  -     HIV 1/2 AG-AB combo; Future    Screening for cholesterol level  -     Lipid panel; Future    Obesity (BMI 35 0-39 9 without comorbidity)  -     TSH, 3rd generation with Free T4 reflex; Future    Hyperglycemia  -     Comprehensive metabolic panel; Future  -     HEMOGLOBIN A1C W/ EAG ESTIMATION; Future    Encounter for well adult exam with abnormal findings  -     CBC and differential; Future    Other orders  -     Cancel: Vitamin D 25 hydroxy; Future          Subjective:      Patient ID: Anel Celestin is a 25 y o  male  Cc  Per pt  Establish care  25year old male patient here to establish care  PMHx: Asthma otherwise no significant medical history  Headache    This is a recurrent problem  The current episode started more than 1 month ago (4 months)  The problem occurs intermittently  The problem has been waxing and waning  The pain is located in the frontal region  The pain does not radiate  The pain quality is not similar to prior headaches  The quality of the pain is described as stabbing  The pain is at a severity of 0/10  The patient is experiencing no pain  Pertinent negatives include no coughing, dizziness, ear pain, eye pain, eye watering, fever, nausea, numbness, phonophobia, photophobia, tingling, tinnitus, vomiting or weight loss  The symptoms are aggravated by bright light, emotional stress and work  He has tried nothing for the symptoms  The treatment provided no relief  His past medical history is significant for obesity   There is no history of cluster headaches, hypertension, immunosuppression, migraine headaches, migraines in the family, recent head traumas, sinus disease or TMJ  Leg Pain    The incident occurred more than 1 week ago (3 years)  The injury mechanism is unknown  The pain is present in the left knee  The quality of the pain is described as burning  The pain is at a severity of 8/10  The pain is moderate  The pain has been intermittent since onset  Pertinent negatives include no inability to bear weight, loss of motion, numbness or tingling  It is unknown if a foreign body is present  The symptoms are aggravated by movement  He has tried nothing for the symptoms  The treatment provided no relief  Rash   This is a chronic problem  The current episode started more than 1 year ago (years ago)  The problem has been waxing and waning since onset  The affected locations include the right hand ([place] area)  The rash is characterized by itchiness and redness  It is unknown if there was an exposure to a precipitant  Pertinent negatives include no cough, eye pain, fever, nail changes or vomiting  Past treatments include moisturizer  The treatment provided no relief  His past medical history is significant for asthma  There is no history of allergies, eczema or varicella  The following portions of the patient's history were reviewed and updated as appropriate: allergies, current medications, past family history, past medical history, past social history, past surgical history and problem list     Review of Systems   Constitutional: Negative  Negative for chills, fever and weight loss  HENT: Negative  Negative for ear pain and tinnitus  Eyes: Negative  Negative for photophobia and pain  Respiratory: Negative  Negative for cough, chest tightness and wheezing  Cardiovascular: Negative  Negative for chest pain and palpitations  Gastrointestinal: Negative  Negative for nausea and vomiting  Endocrine: Negative  Genitourinary: Negative  Musculoskeletal: Negative      Skin: Positive for rash (right palmar aspect)  Negative for nail changes  Allergic/Immunologic: Negative  Neurological: Positive for headaches  Negative for dizziness, tingling and numbness  Hematological: Negative  Psychiatric/Behavioral: Negative  Objective:      /80 (BP Location: Left arm, Patient Position: Sitting, Cuff Size: Standard)   Pulse 73   Temp (!) 97 3 °F (36 3 °C) (Temporal)   Resp 20   Ht 5' 10" (1 778 m)   Wt 116 kg (256 lb)   SpO2 98%   BMI 36 73 kg/m²          Physical Exam   Constitutional: He is oriented to person, place, and time  He appears well-developed and well-nourished  No distress  HENT:   Head: Normocephalic and atraumatic  Eyes: Pupils are equal, round, and reactive to light  Conjunctivae and EOM are normal    Neck: Normal range of motion  Neck supple  Cardiovascular: Normal rate, regular rhythm, normal heart sounds and intact distal pulses  Exam reveals no gallop and no friction rub  No murmur heard  Pulmonary/Chest: Effort normal and breath sounds normal  No respiratory distress  He has no wheezes  Abdominal: Soft  Bowel sounds are normal  He exhibits no distension  There is no guarding  Musculoskeletal: Normal range of motion  He exhibits tenderness  Left knee: He exhibits normal range of motion, no swelling, no ecchymosis and no erythema  Lymphadenopathy:     He has no cervical adenopathy  Neurological: He is alert and oriented to person, place, and time  Skin: Skin is warm and dry  Capillary refill takes less than 2 seconds  Rash noted  He is not diaphoretic  There is erythema  Psychiatric: He has a normal mood and affect  Thought content normal    Nursing note and vitals reviewed  BMI Counseling: Body mass index is 36 73 kg/m²  Discussed the patient's BMI with him  The BMI is above average  BMI counseling and education was provided to the patient   Nutrition recommendations include reducing portion sizes, 3-5 servings of fruits/vegetables daily, consuming healthier snacks, decreasing soda and/or juice intake, reducing intake of saturated fat and trans fat and reducing intake of cholesterol  Exercise recommendations include moderate aerobic physical activity for 150 minutes/week

## 2019-08-20 NOTE — ASSESSMENT & PLAN NOTE
Discussed with patient to identify and avoid the precipitating allergen or irritant  Use of corticosteroids is first line treatment to affected area twice daily  Palliative interventions such as cool compresses or oatmeal bath to reduce symptoms  Use of calamine or Burows solution can also help to dry rash  To follow up if s/s do not improve after 2 weeks of use  -To trial triamcinolone 0 1% cream x 1 month

## 2019-08-20 NOTE — PRE-PROCEDURE INSTRUCTIONS
No outpatient medications have been marked as taking for the 8/21/19 encounter [facility] Encounter)

## 2019-08-20 NOTE — ANESTHESIA PREPROCEDURE EVALUATION
Review of Systems/Medical History  Patient summary reviewed  Chart reviewed  No history of anesthetic complications     Cardiovascular  Exercise tolerance (METS): >4,     Pulmonary  Smoker cigarette smoker and ex-smoker  Cumulative Pack Years: 3, Asthma , well controlled/ stable Last rescue: < 1 week ago Asthma type of rescue: chronic medication usage, No sleep apnea ,        GI/Hepatic  Negative GI/hepatic ROS     Comment: Increased ETOH use          Endo/Other    Obesity  morbid obesity   GYN       Hematology  Negative hematology ROS      Musculoskeletal  Negative musculoskeletal ROS        Neurology    Headaches,    Psychology   Negative psychology ROS              Physical Exam    Airway    Mallampati score: II  TM Distance: >3 FB       Dental       Cardiovascular      Pulmonary      Other Findings  Fixed upper and lower teeth and in good repair      Anesthesia Plan  ASA Score- 2     Anesthesia Type- general with ASA Monitors  Additional Monitors:   Airway Plan: LMA  Comment: Citizen of [place] only   Ms Cheryle Kindler excellent   Likely BRITTANY  Plan Factors-Patient not instructed to abstain from smoking on day of procedure  Patient did not smoke on day of surgery  Induction- intravenous  Postoperative Plan- Plan for postoperative opioid use  Informed Consent- Anesthetic plan and risks discussed with patient and spouse  I personally reviewed this patient with the CRNA  Discussed and agreed on the Anesthesia Plan with the CRNA  Latricia Bella

## 2019-08-21 ENCOUNTER — HOSPITAL ENCOUNTER (OUTPATIENT)
Facility: HOSPITAL | Age: 24
Setting detail: OUTPATIENT SURGERY
Discharge: HOME | End: 2019-08-21
Attending: PLASTIC SURGERY | Admitting: PLASTIC SURGERY
Payer: COMMERCIAL

## 2019-08-21 ENCOUNTER — ANESTHESIA (OUTPATIENT)
Dept: PERIOP | Facility: HOSPITAL | Age: 24
End: 2019-08-21
Payer: COMMERCIAL

## 2019-08-21 VITALS
BODY MASS INDEX: 36.47 KG/M2 | TEMPERATURE: 97.3 F | SYSTOLIC BLOOD PRESSURE: 157 MMHG | DIASTOLIC BLOOD PRESSURE: 80 MMHG | HEART RATE: 98 BPM | HEIGHT: 70 IN | OXYGEN SATURATION: 99 % | RESPIRATION RATE: 18 BRPM

## 2019-08-21 DIAGNOSIS — S66.100A UNSPECIFIED INJURY OF FLEXOR MUSCLE, FASCIA AND TENDON OF RIGHT INDEX FINGER AT WRIST AND HAND LEVEL, INITIAL ENCOUNTER: ICD-10-CM

## 2019-08-21 LAB
HIV 1+2 AB+HIV1 P24 AG SERPL QL IA: NORMAL
RPR SER QL: NORMAL

## 2019-08-21 PROCEDURE — 88305 TISSUE EXAM BY PATHOLOGIST: CPT | Performed by: PATHOLOGY

## 2019-08-21 RX ORDER — MAGNESIUM HYDROXIDE 1200 MG/15ML
LIQUID ORAL AS NEEDED
Status: DISCONTINUED | OUTPATIENT
Start: 2019-08-21 | End: 2019-08-21 | Stop reason: HOSPADM

## 2019-08-21 RX ORDER — FENTANYL CITRATE/PF 50 MCG/ML
12.5 SYRINGE (ML) INJECTION
Status: DISCONTINUED | OUTPATIENT
Start: 2019-08-21 | End: 2019-08-21 | Stop reason: HOSPADM

## 2019-08-21 RX ORDER — ONDANSETRON 2 MG/ML
INJECTION INTRAMUSCULAR; INTRAVENOUS AS NEEDED
Status: DISCONTINUED | OUTPATIENT
Start: 2019-08-21 | End: 2019-08-21 | Stop reason: SURG

## 2019-08-21 RX ORDER — SODIUM CHLORIDE, SODIUM LACTATE, POTASSIUM CHLORIDE, CALCIUM CHLORIDE 600; 310; 30; 20 MG/100ML; MG/100ML; MG/100ML; MG/100ML
75 INJECTION, SOLUTION INTRAVENOUS CONTINUOUS
Status: DISCONTINUED | OUTPATIENT
Start: 2019-08-21 | End: 2019-08-21 | Stop reason: HOSPADM

## 2019-08-21 RX ORDER — DEXAMETHASONE SODIUM PHOSPHATE 4 MG/ML
4 INJECTION, SOLUTION INTRA-ARTICULAR; INTRALESIONAL; INTRAMUSCULAR; INTRAVENOUS; SOFT TISSUE ONCE AS NEEDED
Status: DISCONTINUED | OUTPATIENT
Start: 2019-08-21 | End: 2019-08-21 | Stop reason: HOSPADM

## 2019-08-21 RX ORDER — LIDOCAINE HYDROCHLORIDE 10 MG/ML
INJECTION, SOLUTION INFILTRATION; PERINEURAL AS NEEDED
Status: DISCONTINUED | OUTPATIENT
Start: 2019-08-21 | End: 2019-08-21 | Stop reason: SURG

## 2019-08-21 RX ORDER — PROPOFOL 10 MG/ML
INJECTION, EMULSION INTRAVENOUS AS NEEDED
Status: DISCONTINUED | OUTPATIENT
Start: 2019-08-21 | End: 2019-08-21 | Stop reason: SURG

## 2019-08-21 RX ORDER — MIDAZOLAM HYDROCHLORIDE 1 MG/ML
INJECTION INTRAMUSCULAR; INTRAVENOUS AS NEEDED
Status: DISCONTINUED | OUTPATIENT
Start: 2019-08-21 | End: 2019-08-21 | Stop reason: SURG

## 2019-08-21 RX ORDER — FENTANYL CITRATE/PF 50 MCG/ML
25 SYRINGE (ML) INJECTION
Status: DISCONTINUED | OUTPATIENT
Start: 2019-08-21 | End: 2019-08-21 | Stop reason: HOSPADM

## 2019-08-21 RX ORDER — BUPIVACAINE HYDROCHLORIDE 2.5 MG/ML
INJECTION, SOLUTION INFILTRATION; PERINEURAL AS NEEDED
Status: DISCONTINUED | OUTPATIENT
Start: 2019-08-21 | End: 2019-08-21 | Stop reason: HOSPADM

## 2019-08-21 RX ORDER — METOCLOPRAMIDE HYDROCHLORIDE 5 MG/ML
INJECTION INTRAMUSCULAR; INTRAVENOUS AS NEEDED
Status: DISCONTINUED | OUTPATIENT
Start: 2019-08-21 | End: 2019-08-21 | Stop reason: SURG

## 2019-08-21 RX ORDER — FENTANYL CITRATE/PF 50 MCG/ML
25 SYRINGE (ML) INJECTION
Status: COMPLETED | OUTPATIENT
Start: 2019-08-21 | End: 2019-08-21

## 2019-08-21 RX ORDER — DIPHENHYDRAMINE HYDROCHLORIDE 50 MG/ML
12.5 INJECTION INTRAMUSCULAR; INTRAVENOUS ONCE
Status: DISCONTINUED | OUTPATIENT
Start: 2019-08-21 | End: 2019-08-21 | Stop reason: HOSPADM

## 2019-08-21 RX ORDER — DEXAMETHASONE SODIUM PHOSPHATE 4 MG/ML
INJECTION, SOLUTION INTRA-ARTICULAR; INTRALESIONAL; INTRAMUSCULAR; INTRAVENOUS; SOFT TISSUE AS NEEDED
Status: DISCONTINUED | OUTPATIENT
Start: 2019-08-21 | End: 2019-08-21 | Stop reason: SURG

## 2019-08-21 RX ORDER — ONDANSETRON 4 MG/1
4 TABLET, ORALLY DISINTEGRATING ORAL EVERY 6 HOURS PRN
Status: DISCONTINUED | OUTPATIENT
Start: 2019-08-21 | End: 2019-08-21 | Stop reason: HOSPADM

## 2019-08-21 RX ORDER — HYDROCODONE BITARTRATE AND ACETAMINOPHEN 5; 325 MG/1; MG/1
1 TABLET ORAL EVERY 6 HOURS PRN
Status: DISCONTINUED | OUTPATIENT
Start: 2019-08-21 | End: 2019-08-21 | Stop reason: HOSPADM

## 2019-08-21 RX ORDER — FENTANYL CITRATE 50 UG/ML
INJECTION, SOLUTION INTRAMUSCULAR; INTRAVENOUS AS NEEDED
Status: DISCONTINUED | OUTPATIENT
Start: 2019-08-21 | End: 2019-08-21 | Stop reason: SURG

## 2019-08-21 RX ADMIN — LIDOCAINE HYDROCHLORIDE 50 MG: 10 INJECTION, SOLUTION INFILTRATION; PERINEURAL at 14:24

## 2019-08-21 RX ADMIN — FENTANYL CITRATE 50 MCG: 50 INJECTION INTRAMUSCULAR; INTRAVENOUS at 15:10

## 2019-08-21 RX ADMIN — HYDROCODONE BITARTRATE AND ACETAMINOPHEN 1 TABLET: 5; 325 TABLET ORAL at 17:49

## 2019-08-21 RX ADMIN — SODIUM CHLORIDE, SODIUM LACTATE, POTASSIUM CHLORIDE, AND CALCIUM CHLORIDE: .6; .31; .03; .02 INJECTION, SOLUTION INTRAVENOUS at 15:35

## 2019-08-21 RX ADMIN — METOCLOPRAMIDE 10 MG: 5 INJECTION, SOLUTION INTRAMUSCULAR; INTRAVENOUS at 15:19

## 2019-08-21 RX ADMIN — FENTANYL CITRATE 25 MCG: 50 INJECTION INTRAMUSCULAR; INTRAVENOUS at 15:52

## 2019-08-21 RX ADMIN — FENTANYL CITRATE 25 MCG: 50 INJECTION INTRAMUSCULAR; INTRAVENOUS at 15:47

## 2019-08-21 RX ADMIN — FENTANYL CITRATE: 50 INJECTION, SOLUTION INTRAMUSCULAR; INTRAVENOUS at 17:20

## 2019-08-21 RX ADMIN — FENTANYL CITRATE: 50 INJECTION INTRAMUSCULAR; INTRAVENOUS at 16:35

## 2019-08-21 RX ADMIN — MIDAZOLAM HYDROCHLORIDE 2 MG: 1 INJECTION, SOLUTION INTRAMUSCULAR; INTRAVENOUS at 14:23

## 2019-08-21 RX ADMIN — PROPOFOL 200 MG: 10 INJECTION, EMULSION INTRAVENOUS at 14:25

## 2019-08-21 RX ADMIN — DEXAMETHASONE SODIUM PHOSPHATE 4 MG: 4 INJECTION, SOLUTION INTRA-ARTICULAR; INTRALESIONAL; INTRAMUSCULAR; INTRAVENOUS; SOFT TISSUE at 15:19

## 2019-08-21 RX ADMIN — SODIUM CHLORIDE, SODIUM LACTATE, POTASSIUM CHLORIDE, AND CALCIUM CHLORIDE: .6; .31; .03; .02 INJECTION, SOLUTION INTRAVENOUS at 14:00

## 2019-08-21 RX ADMIN — FENTANYL CITRATE: 50 INJECTION INTRAMUSCULAR; INTRAVENOUS at 16:40

## 2019-08-21 RX ADMIN — FENTANYL CITRATE: 50 INJECTION INTRAMUSCULAR; INTRAVENOUS at 16:45

## 2019-08-21 RX ADMIN — ONDANSETRON HYDROCHLORIDE 4 MG: 2 INJECTION, SOLUTION INTRAMUSCULAR; INTRAVENOUS at 15:18

## 2019-08-21 RX ADMIN — FENTANYL CITRATE: 50 INJECTION, SOLUTION INTRAMUSCULAR; INTRAVENOUS at 17:10

## 2019-08-21 RX ADMIN — FENTANYL CITRATE 100 MCG: 50 INJECTION INTRAMUSCULAR; INTRAVENOUS at 14:23

## 2019-08-21 NOTE — NURSING NOTE
Returned from PACU at 1738  Alert and oriented  C/o pain 7/10 right hand due to surgery  Medicated with Norco 1 tab for pain  Fingers with sensation and movement  Warm to touch  Dressing, splint and ACE wrap intact to right hand/arm  Respirations easy and non labored  IV fluids continue  Call bell in reach  Family at bedside

## 2019-08-21 NOTE — INTERVAL H&P NOTE
H&P reviewed  After examining the patient I find no changes in the patients condition since the H&P had been written      Vitals:    08/21/19 1202   BP: 139/63   Pulse: 78   Resp: 20   Temp: 98 5 °F (36 9 °C)   SpO2: 99%

## 2019-08-21 NOTE — ANESTHESIA POSTPROCEDURE EVALUATION
Post-Op Assessment Note    CV Status:  Stable  Pain Score: 1    Pain management: adequate     Mental Status:  Alert and awake   Hydration Status:  Euvolemic   PONV Controlled:  Controlled   Airway Patency:  Patent   Post Op Vitals Reviewed: Yes      Staff: Anesthesiologist, CRNA   Comments: LMA used without any issues post op          BP      Temp      Pulse     Resp      SpO2

## 2019-08-22 NOTE — OP NOTE
OPERATIVE REPORT  PATIENT NAME: Michelle Peng    :  1995  MRN: [id number]  Pt Location:  OR ROOM 11    SURGERY DATE: 2019    Surgeon(s) and Role:     * Chhaya Maurer MD - Primary     * Kyle Liner - Assisting    Preop and postoperative Diagnosis:  Glass injury right hand with flexor tendon laceration right ring finger and ulnar digital nerve laceration    Operative Procedure(s) (LRB):  REPAIR FLEXOR TENDONS/NERVES HAND W/MICROSCOPE (Right) sublimis and profundus right ring finger and ulnar digital nerve right ring finger    Operative history:  The patient last week in [facility] smashed a glass sustaining lacerations to the pulp of his right little finger, volar base of his right index finger, and most serious to the ulnar proximal aspect of the right ring finger  Expect shin of the wounds of the little and index finger showed that the profundus and sublimis tendons at respectively were intact as well as the the involved area digital nerves in did not require repair  However the same was not true for the ring finger as the sublimis tendon had been completely divided but did not retract, the profundus tendon had a partial laceration  Both these were repaired  Ulnar digital nerve was completely divided at the bifurcation of the dorsal sensory branches well as the continuation of the proper volar nerve  This was repaired with a fascicular type repair with the operating microscope  Operative procedure:  Patient taken the operating placed supine the operating table  He was prepped and draped in the usual fashion:  Anesthesia was general via laryngeal mask anesthesia  The right upper extremity was exsanguinated an Esmarch and a tourniquet  Inflated 250 mm pressure  Minor extension of the wounds of the right little and index finger were done to allow by blunt dissection and inspection of all the underlying flexor tendons and involved digital nerves following these all be intact      However, the same was not true for the right ring finger  Crisscross extension of the wound injury had to be made on the volar aspect, but blunt dissection the flexor tendon sheath was violated the both the flexor tendons lacerated, the ulnar digital nerve was found proximal distal to where was also totally divided, the sublimis tendon was repaired with a 4-0 Prolene continuous whip stitch incorporating both tails, the laceration in the profundus tendon was repaired with a 4-0 Prolene whipstitch as well as it was rather small, flexor tendon sheath had to be opened somewhat proximally so as the suture line would not get caught in it,    Next the operating microscope was brought on the field  The traumatized ends of the ulnar digital nerve were freshened back to normal appearing fascicles  For the fascicles were then repaired with 10 0 nylon simple sutures to corresponding fascicles for both this and separately the dorsal branch and the continuing the  volar branch  Next all skin was closed with 4 interrupted simple sutures  Marcaine 0 25% plain digital block was done on all involved operative digits  A bulky hand dressing placed in the usual fashion with a dorsal splint to maintain the wrist and fingers in flexion  The patient tolerated procedure well taken recovery in good condition      Specimen(s):  ID Type Source Tests Collected by Time Destination   1 : ULNAR DIGITAL NERVE RIGHT RING FINGER Tissue Nerve TISSUE EXAM Power Harden MD 8/21/2019 1535        Estimated Blood Loss:   Minimal        SIGNATURE: Power Harden MD  DATE: August 21, 2019  TIME: 8:34 PM

## 2019-08-23 LAB
C TRACH DNA SPEC QL NAA+PROBE: NEGATIVE
N GONORRHOEA DNA SPEC QL NAA+PROBE: NEGATIVE

## 2019-10-02 ENCOUNTER — TELEPHONE (OUTPATIENT)
Dept: OTHER | Facility: OTHER | Age: 24
End: 2019-10-02

## 2019-10-02 NOTE — TELEPHONE ENCOUNTER
Pt is requesting a call back  Pt would like to make an appointment for 10/2/19 due to a follow up appointment

## 2019-10-18 ENCOUNTER — OFFICE VISIT (OUTPATIENT)
Dept: FAMILY MEDICINE CLINIC | Facility: CLINIC | Age: 24
End: 2019-10-18
Payer: COMMERCIAL

## 2019-10-18 VITALS
TEMPERATURE: 98 F | HEIGHT: 70 IN | OXYGEN SATURATION: 97 % | WEIGHT: 256.6 LBS | HEART RATE: 75 BPM | BODY MASS INDEX: 36.73 KG/M2 | DIASTOLIC BLOOD PRESSURE: 80 MMHG | SYSTOLIC BLOOD PRESSURE: 110 MMHG

## 2019-10-18 DIAGNOSIS — E66.9 OBESITY (BMI 35.0-39.9 WITHOUT COMORBIDITY): ICD-10-CM

## 2019-10-18 DIAGNOSIS — Z00.00 ANNUAL PHYSICAL EXAM: Primary | ICD-10-CM

## 2019-10-18 DIAGNOSIS — Z23 NEED FOR INFLUENZA VACCINATION: ICD-10-CM

## 2019-10-18 PROCEDURE — 99395 PREV VISIT EST AGE 18-39: CPT | Performed by: NURSE PRACTITIONER

## 2019-10-18 NOTE — PROGRESS NOTES
Assessment/Plan:    Annual physical exam  Patient is here for physical exam we find this visit without any distress or abnormalities  We recommended exercise at least three and 0 5 hours per week and healthy diet with a low carbohydrate and low saturated fat  Began to follow up in one year for regular physical exams  Obesity (BMI 35 0-39 9 without comorbidity)  -We discussed topic at previous visit  Patient having trouble losing weight and would like to see weight   Referral given today  Diagnoses and all orders for this visit:    Annual physical exam    Obesity (BMI 35 0-39 9 without comorbidity)  -     Ambulatory referral to Weight Management; Future    Need for influenza vaccination  -     influenza vaccine, [id number], quadrivalent, 0 5 mL, preservative-free, for adult and pediatric patients 6 mos+ (AFLURIA, FLUARIX, FLULAVAL, FLUZONE)          Subjective:      Patient ID: Letty Bartholomew is a 25 y o  male  25year old male patient here for a physical exam  States he has not vision problems and so he does not need eye doctor and dental he will make his own appointment  Today he would like a referral to weight management as he knows he is overweight  His current BMI is 36 82  The following portions of the patient's history were reviewed and updated as appropriate: allergies, current medications, past family history, past medical history, past social history, past surgical history and problem list     Review of Systems   Constitutional: Positive for appetite change and unexpected weight change  Negative for fatigue and fever  HENT: Negative  Eyes: Negative  Respiratory: Negative  Negative for cough, chest tightness, shortness of breath and wheezing  Cardiovascular: Negative  Negative for chest pain and palpitations  Gastrointestinal: Negative  Negative for abdominal distention and anal bleeding  Endocrine: Negative  Genitourinary: Negative  Musculoskeletal: Negative  Negative for arthralgias, gait problem and joint swelling  Skin: Negative  Negative for color change and rash  Allergic/Immunologic: Negative  Neurological: Negative  Negative for dizziness and headaches  Hematological: Negative  Psychiatric/Behavioral: Negative  Objective:      /80 (BP Location: Left arm, Patient Position: Sitting, Cuff Size: Adult)   Pulse 75   Temp 98 °F (36 7 °C) (Oral)   Ht 5' 10" (1 778 m)   Wt 116 kg (256 lb 9 6 oz)   SpO2 97%   BMI 36 82 kg/m²          Physical Exam   Constitutional: He is oriented to person, place, and time  He appears well-developed and well-nourished  No distress  HENT:   Head: Normocephalic and atraumatic  Right Ear: External ear normal    Left Ear: External ear normal    Nose: Nose normal    Mouth/Throat: Oropharynx is clear and moist    Eyes: Pupils are equal, round, and reactive to light  Conjunctivae and EOM are normal  Right eye exhibits no discharge  Left eye exhibits no discharge  Neck: Normal range of motion  Neck supple  No thyromegaly present  Cardiovascular: Normal rate, regular rhythm and intact distal pulses  Exam reveals no gallop and no friction rub  No murmur heard  Pulmonary/Chest: Effort normal and breath sounds normal  No respiratory distress  He has no wheezes  Abdominal: Soft  Bowel sounds are normal  He exhibits no distension  There is no tenderness  Musculoskeletal: Normal range of motion  Neurological: He is alert and oriented to person, place, and time  He has normal reflexes  Skin: Skin is warm and dry  Capillary refill takes less than 2 seconds  He is not diaphoretic  Psychiatric: He has a normal mood and affect  His behavior is normal  Judgment and thought content normal    Nursing note and vitals reviewed

## 2019-10-18 NOTE — PATIENT INSTRUCTIONS
Visita de bienestar para los adultos   LO QUE NECESITA SABER:   ¿Qué es christi visita de bienestar? Darian Sharrik de bienestar es cuando usted acude con un médico para que le marcy exámenes de detección de problemas de [place]  También puede obtener asesoramiento sobre cómo mantenerse saludable  Anote dariela preguntas para que se acuerde de hacerlas  Pregunte a elise médico con qué frecuencia debería realizarse christi visita de bienestar  ¿Qupe sucede en christi visita de bienestar? Elise médico le preguntará sobre elise jon y elise historia familiar [id number] [facility]  [place] incluye presión arterial lisa, enfermedades del corazón y cáncer  El médico le preguntará si tiene síntomas que le preocupen, si [facility] y [place] de ánimo  También se le preguntará acerca del uso de medicamentos, suplementos, alimentos y alcohol  Es posible que le marcy cualquiera de lo siguiente:  · Elise peso  será revisado  Es posible que [employer] midan elise altura para calcular elise índice de masa corporal [facility])  [facility] indica si tiene un peso saludable  · Se verificarán elise presión arterial  y frecuencia cardíaca  También pueden revisar elise temperatura  · Exámenes de [facility] y [facility]  se podría realizar  Se podrían realizar exámenes de yudi para revisar los niveles de [facility]  Los niveles anormales de colesterol aumentan el riesgo de enfermedad del corazón y accidente cerebrovascular  Puede que también necesite christi prueba de yudi u orina para revisar si tiene diabetes si usted está en mayor riesgo  Las pruebas de orina pueden hacerse para buscar signos de christi infección o christi enfermedad renal      · Un examen físico  incluye la comprobación de dariela latidos del corazón y los pulmones con un estetoscopio  Elise médico también podría revisarle la piel para buscar daños causados por el sol  · Pruebas de detección  podría recomendarse  Se realiza un examen de detección para detectar enfermedades que pueden no causar síntomas   Los exámenes de Ori 'RICHARD' Us necesite dependen de elise edad, género, antecedentes familiares y hábitos de alfred  Por ejemplo, podrían recomendarle la exploración selectiva colorrectal si tiene 48 años o más  ¿Qué exámenes de detección necesito si soy christi zenaida? · El examen de Papanicolaou  se utiliza para detectar cáncer de renard uterino  El examen del Papanicolaou por lo general se realiza entre 3 a 5 años dependiendo de elise edad  Puede necesitarlo más a menudo si usted ha tenido [employer] de la prueba de Papanicolaou en el pasado  Pregunte a elise médico con qué frecuencia debería realizarse un examen de Papanicolaou  · Christi mamografía  es christi radiografía de dariela senos para detectar cáncer de mama  Los expertos recomiendan [address] cada 2 años empezando a los 48 años de [place]  Es probable que usted necesite [address] a los 52 años o antes si tiene riesgo alto de cáncer de seno  Hable con elise médico sobre cuándo debe empezar con dariela mamografías y con cuánta frecuencia las necesita  ¿Qué vacunas podría necesitar? · Debe recibir [place] vacuna contra la influenza  todos los [place] alfredo  La vacuna contra la influenza protege de la gripe  Varios tipos de virus causan la influenza  Debido a que los virus [place] con el [place], es necesaria la producción de nuevas vacunas cada año  · Debe recibir Ali [place] vacuna de refuerzo contra el tétanos-difteria (Td)  cada 10 años  Esta vacuna protege contra el tétanos y la difteria  El tétanos es christi infección severa que puede causar trismo y espasmos musculares dolorosos  La difteria es christi infección bacterial grave que produce christi cubierta gruesa en la parte de atrás de la boca y garganta  · Debe recibir la vacuna contra el virus del papiloma humano (VPH)  si usted es zenaida y [place] 19 y 32 años o es hombre y [place] 23 y 24 años y nunca la recibió  Esta vacuna protege contra la infección por VPH   El virus del papiloma humano o VPH es la infección más común que se transmite por contacto sexual  El virus del papiloma humano también podría provocar cáncer vaginal, del pene y del ano  · Debe recibir la vacuna antineumocócica  si tiene más de 72 años  La vacuna antineumocócica es christi inyección que se administra para protegerlo contra christi enfermedad neumocócica  La enfermedad neumocócica es christi infección causada por la bacteria neumocócica  La infección puede causar neumonía, meningitis o christi infección del oído  · Debe recibir la vacuna contra la culebrilla  si tiene [address] [place] [address],[address] [place] [address] o [place], incluso si cardenas tenido culebrilla antes  La vacuna contra la culebrilla (herpes zóster) es christi inyección usada para proteger contra el virus zóster que causa la varicela  Annika es el mismo virus que causa la varicela  La culebrilla es un sarpullido doloroso que se desarrolla en personas que tuvieron varicela o cardenas estado expuestas al virus  ¿Cómo puedo comer de manera saludable? [place] es un modelo para planear comidas sanas  Muestra los tipos y cantidades de alimentos que deberían ir en un plato  Brenda Snowball y verduras representan alrededor de la mitad de elise plato y los granos y proteínas representan la otra mitad  Se incluye christi porción de productos lácteos al lado del plato  La cantidad de calorías y [address] de las porciones que usted necesita dependen de elise edad, [place], peso y altura  Los ejemplos de alimentos saludables son:  · Consuma christi variedad de verduras  michael las de color aquilino oscuro, monsalve y [place]  Usted también puede incluir verduras enlatadas bajas en sodio (sal) y verduras congeladas sin mantequilla ni salsas [id number]  · Consuma christi variedad de fruta frescas , las frutas enlatadas en 100% de jugo , fruta [place] y errol secos  · Incluya granos enteros  Por lo menos la mitad de los granos que usted consume deben ser granos de umair integral  Por ejemplo, panes de grano entero, pasta integral, arroz integral y cereales de grano entero michael la yovanny      · Consuma christi variedad de alimentos con proteínas michael mariscos (pescado y crustáceos), Milagros Jabs y carne de ave sin piel (pavo y noreen)  Ejemplos de william magras incluyen pierna, paleta o lomo de puerco y kimberlee, solomillo o, lomo de res y carne [place] de res extra [place]  Otros alimentos ricos en proteínas son los huevos y sustitutos de [place], frijoles, chícharos, productos de soya, nueces y semillas  · Elija productos lácteos bajos en grasa [employer] o del 1% o yogur, queso y requesón bajos en grasa  · Limite las grasas poco saludables,  michael la [place], la margarina dura y la [place]  ¿Qué cantidad de actividad física necesito? Realice christi actividad física por lo menos 30 minutos al día, la mayoría de los días de la [place]  Algunos de los ejercicios incluyen caminar, montar en bicicleta, bailar y nadar  Usted también puede realizar más actividad física usando las escaleras en vez de los ascensores o estacionarse más lejos cuando Alean [place] a las tiendas  Incluya ejercicios para fortalecer los músculos 2 días a la semana  El ejercicio regular proporciona muchos beneficios para la jon  Lesly Vogel a controlar elise peso y [employer] riesgo de diabetes tipo 2, presión arterial lisa, enfermedad del corazón y accidente cerebrovascular  El ejercicio [employer] puede ayudar a mejorar elise estado de ánimo  Pregunte a elise médico acerca del mejor plan de ejercicio para usted  ¿Cuáles son Cori Livings generales de jon y seguridad que donna seguir? · No fume  La nicotina y otras sustancias químicas que contienen los cigarrillos y cigarros pueden dañar los pulmones  Pida información a elise médico si usted actualmente fuma y necesita ayuda para dejar de fumar  Los cigarrillos electrónicos o tabaco sin humo todavía contienen nicotina  Consulte con elise médico antes de [employer]  · Limite el consumo de alcohol  Un trago equivale a 12 onzas de cerveza, 5 onzas de vino o 1 onza y ½ de licor      · Baje de peso, si es necesario  El sobrepeso aumenta el riesgo de ciertas condiciones de [place]  Estos incluyen enfermedad del corazón, presión arterial lisa, diabetes tipo 2 y ciertos tipos de cáncer  · Proteja elise piel  No tome el sol ni use jonathan de bronceado  Use protector solar con un SPF 13 o mayor  Aplíquese el bloqueador por lo menos 15 minutos antes de que vaya a estar al [employer]  Vuelva a aplicarse la crema solar cada 2 horas  Use ropa protectora, sombrero y lentes para el sol cuando se encuentre afuera  · Conduzca con seguridad  Use siempre elise cinturón de seguridad  Asegúrese que todos en el niles usan el cinturón de seguridad  Un cinturón de seguridad puede salvar elise alfred en mariela de un accidente automovilístico  No use el celular cuando esté manejando  [place] puede hacer que se distraiga y causar un accidente  Es mejor que pare y se orille si necesita hacer christi Elfreda Curl un texto  · Practique el sexo seguro  Use condones de látex si es sexualmente [place] y tiene más de [place]  Elise médico puede recomendar exámenes de detección de infecciones de transmisión sexual (ITS)  · Use un leo, un chaleco salvavidas y unos implementos de protección  Siempre use un leo al [employer] en bicicleta o motocicleta, esquiar o jugar deportes que podrían causar christi lesión en la vivian  Use implementos de protección cuando practique deportes  Use un chaleco salvavidas cuando esté en un bote o practicando actividades acuáticas  ACUERDOS SOBRE ELISE CUIDADO:   Usted tiene el derecho de ayudar a planear elise cuidado  Aprenda todo lo que pueda sobre elise condición y michael darle tratamiento  Discuta dariela opciones de tratamiento con dariela médicos para decidir el cuidado que usted desea recibir  Usted siempre tiene el derecho de rechazar el tratamiento  Esta información es sólo para uso en educación  Elise intención no es darle un consejo médico sobre enfermedades o tratamientos   Colsulte con elise médico, enfermera o farmacéutico antes de seguir cualquier régimen médico para saber si es seguro y efectivo para usted  © 2017 [address] Information is for End User's use only and may not be sold, redistributed or otherwise used for commercial purposes  All illustrations and images included in CareNotes® are the copyrighted property of A D A M , Inc  or Candelario Guzman

## 2019-10-21 PROBLEM — Z00.00 ANNUAL PHYSICAL EXAM: Status: ACTIVE | Noted: 2019-10-21

## 2019-10-21 PROBLEM — E66.9 OBESITY (BMI 35.0-39.9 WITHOUT COMORBIDITY): Status: ACTIVE | Noted: 2019-10-21

## 2019-10-21 NOTE — ASSESSMENT & PLAN NOTE
-We discussed topic at previous visit  Patient having trouble losing weight and would like to see weight   Referral given today

## 2020-01-15 ENCOUNTER — APPOINTMENT (OUTPATIENT)
Dept: LAB | Facility: HOSPITAL | Age: 25
End: 2020-01-15
Payer: COMMERCIAL

## 2020-01-15 ENCOUNTER — OFFICE VISIT (OUTPATIENT)
Dept: FAMILY MEDICINE CLINIC | Facility: CLINIC | Age: 25
End: 2020-01-15
Payer: COMMERCIAL

## 2020-01-15 VITALS
BODY MASS INDEX: 37.08 KG/M2 | DIASTOLIC BLOOD PRESSURE: 100 MMHG | RESPIRATION RATE: 18 BRPM | HEIGHT: 70 IN | TEMPERATURE: 98.8 F | OXYGEN SATURATION: 97 % | HEART RATE: 102 BPM | SYSTOLIC BLOOD PRESSURE: 140 MMHG | WEIGHT: 259 LBS

## 2020-01-15 DIAGNOSIS — R30.0 DYSURIA: ICD-10-CM

## 2020-01-15 DIAGNOSIS — Z20.2 STD EXPOSURE: Primary | ICD-10-CM

## 2020-01-15 DIAGNOSIS — Z00.01 ENCOUNTER FOR WELL ADULT EXAM WITH ABNORMAL FINDINGS: ICD-10-CM

## 2020-01-15 DIAGNOSIS — R03.0 ELEVATED BLOOD PRESSURE READING WITHOUT DIAGNOSIS OF HYPERTENSION: ICD-10-CM

## 2020-01-15 DIAGNOSIS — Z20.2 STD EXPOSURE: ICD-10-CM

## 2020-01-15 LAB
BASOPHILS # BLD AUTO: 0.1 THOUSANDS/ΜL (ref 0–0.1)
BASOPHILS NFR BLD AUTO: 1 % (ref 0–1)
EOSINOPHIL # BLD AUTO: 0.1 THOUSAND/ΜL (ref 0–0.4)
EOSINOPHIL NFR BLD AUTO: 1 % (ref 0–6)
ERYTHROCYTE [DISTWIDTH] IN BLOOD BY AUTOMATED COUNT: 14.3 %
HAV IGM SER QL: NORMAL
HBV CORE IGM SER QL: NORMAL
HBV SURFACE AG SER QL: NORMAL
HCT VFR BLD AUTO: 43.7 % (ref 41–53)
HCV AB SER QL: NORMAL
HGB BLD-MCNC: 13.6 G/DL (ref 13.5–17.5)
LYMPHOCYTES # BLD AUTO: 2.1 THOUSANDS/ΜL (ref 0.5–4)
LYMPHOCYTES NFR BLD AUTO: 19 % (ref 25–45)
MCH RBC QN AUTO: 24.6 PG (ref 26–34)
MCHC RBC AUTO-ENTMCNC: 31.2 G/DL (ref 31–36)
MCV RBC AUTO: 79 FL (ref 80–100)
MICROCYTES BLD QL AUTO: PRESENT
MONOCYTES # BLD AUTO: 1 THOUSAND/ΜL (ref 0.2–0.9)
MONOCYTES NFR BLD AUTO: 9 % (ref 1–10)
NEUTROPHILS # BLD AUTO: 7.9 THOUSANDS/ΜL (ref 1.8–7.8)
NEUTS SEG NFR BLD AUTO: 71 % (ref 45–65)
PLATELET # BLD AUTO: 238 THOUSANDS/UL (ref 150–450)
PLATELET BLD QL SMEAR: ADEQUATE
PMV BLD AUTO: 10.3 FL (ref 8.9–12.7)
RBC # BLD AUTO: 5.55 MILLION/UL (ref 4.5–5.9)
RBC MORPH BLD: NORMAL
SL AMB  POCT GLUCOSE, UA: NEGATIVE
SL AMB LEUKOCYTE ESTERASE,UA: NEGATIVE
SL AMB POCT BILIRUBIN,UA: NEGATIVE
SL AMB POCT BLOOD,UA: NEGATIVE
SL AMB POCT CLARITY,UA: NORMAL
SL AMB POCT COLOR,UA: NORMAL
SL AMB POCT KETONES,UA: NEGATIVE
SL AMB POCT NITRITE,UA: NEGATIVE
SL AMB POCT PH,UA: 5
SL AMB POCT SPECIFIC GRAVITY,UA: 1000
SL AMB POCT URINE PROTEIN: NEGATIVE
SL AMB POCT UROBILINOGEN: 0.2
WBC # BLD AUTO: 11.1 THOUSAND/UL (ref 4.5–11)

## 2020-01-15 PROCEDURE — 87389 HIV-1 AG W/HIV-1&-2 AB AG IA: CPT

## 2020-01-15 PROCEDURE — 81002 URINALYSIS NONAUTO W/O SCOPE: CPT | Performed by: NURSE PRACTITIONER

## 2020-01-15 PROCEDURE — 86592 SYPHILIS TEST NON-TREP QUAL: CPT

## 2020-01-15 PROCEDURE — 80074 ACUTE HEPATITIS PANEL: CPT

## 2020-01-15 PROCEDURE — 36415 COLL VENOUS BLD VENIPUNCTURE: CPT

## 2020-01-15 PROCEDURE — 87491 CHLMYD TRACH DNA AMP PROBE: CPT

## 2020-01-15 PROCEDURE — 85025 COMPLETE CBC W/AUTO DIFF WBC: CPT

## 2020-01-15 PROCEDURE — 87591 N.GONORRHOEAE DNA AMP PROB: CPT

## 2020-01-15 PROCEDURE — 99214 OFFICE O/P EST MOD 30 MIN: CPT | Performed by: NURSE PRACTITIONER

## 2020-01-15 PROCEDURE — 3008F BODY MASS INDEX DOCD: CPT | Performed by: NURSE PRACTITIONER

## 2020-01-15 NOTE — ASSESSMENT & PLAN NOTE
-Ordering STD workup on patient  Patient just found out about 2 weeks ago his wife had another partner and he may have been exposed to a STD as he is symptomatic  Discussed with patient that once results are in we would treat him and send him antibiotics  His UA in office did not show a UTI  Pending results

## 2020-01-15 NOTE — ASSESSMENT & PLAN NOTE
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

## 2020-01-16 ENCOUNTER — TELEPHONE (OUTPATIENT)
Dept: FAMILY MEDICINE CLINIC | Facility: CLINIC | Age: 25
End: 2020-01-16

## 2020-01-16 DIAGNOSIS — A74.9 CHLAMYDIA INFECTION: Primary | ICD-10-CM

## 2020-01-16 LAB
C TRACH DNA SPEC QL NAA+PROBE: POSITIVE
N GONORRHOEA DNA SPEC QL NAA+PROBE: NEGATIVE
RPR SER QL: NORMAL

## 2020-01-16 RX ORDER — AZITHROMYCIN 500 MG/1
1000 TABLET, FILM COATED ORAL ONCE
Qty: 4 TABLET | Refills: 0 | Status: SHIPPED | OUTPATIENT
Start: 2020-01-16 | End: 2020-01-16

## 2020-01-16 RX ORDER — AZITHROMYCIN 500 MG/1
1000 TABLET, FILM COATED ORAL ONCE
Qty: 2 TABLET | Refills: 0 | Status: SHIPPED | OUTPATIENT
Start: 2020-01-16 | End: 2020-01-16 | Stop reason: SDUPTHER

## 2020-01-17 ENCOUNTER — TELEPHONE (OUTPATIENT)
Dept: OTHER | Facility: OTHER | Age: 25
End: 2020-01-17

## 2020-01-17 LAB — HIV 1+2 AB+HIV1 P24 AG SERPL QL IA: NORMAL

## 2020-01-28 ENCOUNTER — APPOINTMENT (OUTPATIENT)
Dept: LAB | Facility: HOSPITAL | Age: 25
End: 2020-01-28
Payer: COMMERCIAL

## 2020-01-28 ENCOUNTER — TELEPHONE (OUTPATIENT)
Dept: FAMILY MEDICINE CLINIC | Facility: CLINIC | Age: 25
End: 2020-01-28

## 2020-01-28 DIAGNOSIS — Z20.2 STD EXPOSURE: Primary | ICD-10-CM

## 2020-01-28 PROCEDURE — 87491 CHLMYD TRACH DNA AMP PROBE: CPT | Performed by: NURSE PRACTITIONER

## 2020-01-28 PROCEDURE — 87591 N.GONORRHOEAE DNA AMP PROB: CPT | Performed by: NURSE PRACTITIONER

## 2020-01-28 NOTE — TELEPHONE ENCOUNTER
Pt stated he was diagnosed with chlamydia and was given the medication, he wants to take another dose  Adv ised him that usually one dose is enough and is not needed  He can re test to confirm he does not have STD he declined  Wants more medication send to pharmacy  Advised him we will give him a call back

## 2020-01-29 LAB
C TRACH DNA SPEC QL NAA+PROBE: NEGATIVE
N GONORRHOEA DNA SPEC QL NAA+PROBE: NEGATIVE

## 2020-02-25 ENCOUNTER — OFFICE VISIT (OUTPATIENT)
Dept: FAMILY MEDICINE CLINIC | Facility: CLINIC | Age: 25
End: 2020-02-25
Payer: COMMERCIAL

## 2020-02-25 VITALS
HEART RATE: 98 BPM | WEIGHT: 257 LBS | DIASTOLIC BLOOD PRESSURE: 80 MMHG | OXYGEN SATURATION: 96 % | HEIGHT: 70 IN | TEMPERATURE: 98.7 F | BODY MASS INDEX: 36.79 KG/M2 | RESPIRATION RATE: 18 BRPM | SYSTOLIC BLOOD PRESSURE: 120 MMHG

## 2020-02-25 DIAGNOSIS — R30.0 DYSURIA: Primary | ICD-10-CM

## 2020-02-25 LAB
SL AMB  POCT GLUCOSE, UA: NORMAL
SL AMB LEUKOCYTE ESTERASE,UA: NORMAL
SL AMB POCT BILIRUBIN,UA: NORMAL
SL AMB POCT BLOOD,UA: NORMAL
SL AMB POCT CLARITY,UA: NORMAL
SL AMB POCT COLOR,UA: CLEAR
SL AMB POCT KETONES,UA: NORMAL
SL AMB POCT NITRITE,UA: NORMAL
SL AMB POCT PH,UA: 5
SL AMB POCT SPECIFIC GRAVITY,UA: 1.01
SL AMB POCT URINE PROTEIN: NORMAL
SL AMB POCT UROBILINOGEN: 0.2

## 2020-02-25 PROCEDURE — 3008F BODY MASS INDEX DOCD: CPT | Performed by: NURSE PRACTITIONER

## 2020-02-25 PROCEDURE — 81002 URINALYSIS NONAUTO W/O SCOPE: CPT | Performed by: NURSE PRACTITIONER

## 2020-02-25 PROCEDURE — 99214 OFFICE O/P EST MOD 30 MIN: CPT | Performed by: NURSE PRACTITIONER

## 2020-02-25 PROCEDURE — 87086 URINE CULTURE/COLONY COUNT: CPT | Performed by: NURSE PRACTITIONER

## 2020-02-25 RX ORDER — CIPROFLOXACIN 250 MG/1
250 TABLET, FILM COATED ORAL EVERY 12 HOURS SCHEDULED
Qty: 6 TABLET | Refills: 0 | Status: SHIPPED | OUTPATIENT
Start: 2020-02-25 | End: 2020-02-28

## 2020-02-25 NOTE — ASSESSMENT & PLAN NOTE
-POCT in office normal again  Due to recurrency I am starting on antibiotics and sending out urine culture  Discussed with patient the goal of treatment is to eradicate the bacteria  I am sending out urinalysis and culture  Due to men having complicated UTI's I am starting on cipro BID as this is first line treatment for UTI  I discussed side effects associated with fluoroquinolones with patient  Advised if s/s do not resolve after 1 week or worsen to call office

## 2020-02-25 NOTE — PROGRESS NOTES
Assessment/Plan:    Dysuria  -POCT in office normal again  Due to recurrency I am starting on antibiotics and sending out urine culture  Discussed with patient the goal of treatment is to eradicate the bacteria  I am sending out urinalysis and culture  Due to men having complicated UTI's I am starting on cipro BID as this is first line treatment for UTI  I discussed side effects associated with fluoroquinolones with patient  Advised if s/s do not resolve after 1 week or worsen to call office  Diagnoses and all orders for this visit:    Dysuria  -     POCT urine dip  -     ciprofloxacin (CIPRO) 250 mg tablet; Take 1 tablet (250 mg total) by mouth every 12 (twelve) hours for 3 days  -     Urine culture          Subjective:      Patient ID: Toby Diez is a 25 y o  male  Patient presents with:  Urinary Tract Infection      Difficulty Urinating    This is a new problem  The current episode started more than 1 month ago  The problem occurs intermittently  The problem has been waxing and waning  The quality of the pain is described as burning  The pain is at a severity of 5/10  The pain is mild  There has been no fever  He is not sexually active  There is no history of pyelonephritis  Associated symptoms include frequency and urgency  Pertinent negatives include no chills, discharge, flank pain, hematuria, nausea, possible pregnancy or vomiting  He has tried increased fluids for the symptoms  The treatment provided no relief  There is no history of catheterization, kidney stones, recurrent UTIs, a single kidney, urinary stasis or a urological procedure  The following portions of the patient's history were reviewed and updated as appropriate: allergies, current medications, past family history, past medical history, past social history, past surgical history and problem list     Review of Systems   Constitutional: Negative  Negative for chills, fatigue and fever  HENT: Negative      Eyes: Negative  Respiratory: Negative  Negative for cough, chest tightness, shortness of breath and wheezing  Cardiovascular: Negative  Negative for chest pain and palpitations  Gastrointestinal: Negative  Negative for nausea and vomiting  Endocrine: Negative  Genitourinary: Positive for dysuria, frequency and urgency  Negative for decreased urine volume, difficulty urinating, discharge, flank pain, genital sores, hematuria, scrotal swelling and testicular pain  Musculoskeletal: Negative  Negative for arthralgias  Skin: Negative  Allergic/Immunologic: Negative  Neurological: Negative  Hematological: Negative  Psychiatric/Behavioral: Negative  Objective:      /80 (BP Location: Left arm, Patient Position: Sitting, Cuff Size: Standard)   Pulse 98   Temp 98 7 °F (37 1 °C) (Oral)   Resp 18   Ht 5' 10" (1 778 m)   Wt 117 kg (257 lb)   SpO2 96%   BMI 36 88 kg/m²          Physical Exam   Constitutional: He is oriented to person, place, and time  He appears well-developed and well-nourished  No distress  HENT:   Head: Normocephalic and atraumatic  Right Ear: External ear normal    Left Ear: External ear normal    Eyes: Pupils are equal, round, and reactive to light  EOM are normal    Neck: Normal range of motion  Neck supple  Cardiovascular: Normal rate, regular rhythm, normal heart sounds and intact distal pulses  Exam reveals no gallop and no friction rub  No murmur heard  Pulmonary/Chest: Effort normal and breath sounds normal  No respiratory distress  He has no wheezes  Abdominal: Soft  Normal appearance and bowel sounds are normal  There is no tenderness  There is no rigidity, no rebound, no guarding and no CVA tenderness  Musculoskeletal: Normal range of motion  Lymphadenopathy:     He has no cervical adenopathy  Neurological: He is alert and oriented to person, place, and time  Skin: Skin is warm and dry  Capillary refill takes less than 2 seconds   He is not diaphoretic  Nursing note and vitals reviewed

## 2020-02-26 LAB — BACTERIA UR CULT: NORMAL

## 2020-05-26 DIAGNOSIS — Z11.3 SCREENING FOR STD (SEXUALLY TRANSMITTED DISEASE): Primary | ICD-10-CM

## 2020-05-27 ENCOUNTER — APPOINTMENT (OUTPATIENT)
Dept: LAB | Facility: HOSPITAL | Age: 25
End: 2020-05-27
Payer: COMMERCIAL

## 2020-05-27 DIAGNOSIS — Z11.3 SCREENING FOR STD (SEXUALLY TRANSMITTED DISEASE): ICD-10-CM

## 2020-05-27 PROCEDURE — 87591 N.GONORRHOEAE DNA AMP PROB: CPT

## 2020-05-27 PROCEDURE — 87491 CHLMYD TRACH DNA AMP PROBE: CPT

## 2020-05-28 ENCOUNTER — OFFICE VISIT (OUTPATIENT)
Dept: FAMILY MEDICINE CLINIC | Facility: CLINIC | Age: 25
End: 2020-05-28
Payer: COMMERCIAL

## 2020-05-28 VITALS
WEIGHT: 232 LBS | DIASTOLIC BLOOD PRESSURE: 80 MMHG | SYSTOLIC BLOOD PRESSURE: 130 MMHG | RESPIRATION RATE: 18 BRPM | BODY MASS INDEX: 33.21 KG/M2 | HEART RATE: 94 BPM | OXYGEN SATURATION: 97 % | TEMPERATURE: 98.2 F | HEIGHT: 70 IN

## 2020-05-28 DIAGNOSIS — Z20.2 STD EXPOSURE: ICD-10-CM

## 2020-05-28 DIAGNOSIS — G47.09 OTHER INSOMNIA: Primary | ICD-10-CM

## 2020-05-28 PROCEDURE — 99214 OFFICE O/P EST MOD 30 MIN: CPT | Performed by: NURSE PRACTITIONER

## 2020-05-28 PROCEDURE — 3008F BODY MASS INDEX DOCD: CPT | Performed by: NURSE PRACTITIONER

## 2020-05-28 RX ORDER — HYDROXYZINE HYDROCHLORIDE 25 MG/1
25 TABLET, FILM COATED ORAL
Qty: 30 TABLET | Refills: 0 | Status: SHIPPED | OUTPATIENT
Start: 2020-05-28 | End: 2020-10-23 | Stop reason: ALTCHOICE

## 2020-05-29 LAB
C TRACH DNA SPEC QL NAA+PROBE: NEGATIVE
N GONORRHOEA DNA SPEC QL NAA+PROBE: NEGATIVE

## 2020-10-23 ENCOUNTER — OFFICE VISIT (OUTPATIENT)
Dept: FAMILY MEDICINE CLINIC | Facility: CLINIC | Age: 25
End: 2020-10-23
Payer: COMMERCIAL

## 2020-10-23 DIAGNOSIS — R53.83 OTHER FATIGUE: ICD-10-CM

## 2020-10-23 DIAGNOSIS — R05.9 COUGH: Primary | ICD-10-CM

## 2020-10-23 DIAGNOSIS — Z13.220 SCREENING FOR CHOLESTEROL LEVEL: ICD-10-CM

## 2020-10-23 DIAGNOSIS — R73.9 HYPERGLYCEMIA: ICD-10-CM

## 2020-10-23 PROCEDURE — 99213 OFFICE O/P EST LOW 20 MIN: CPT | Performed by: NURSE PRACTITIONER

## 2020-10-23 RX ORDER — DEXTROMETHORPHAN HYDROBROMIDE AND PROMETHAZINE HYDROCHLORIDE 15; 6.25 MG/5ML; MG/5ML
5 SOLUTION ORAL 4 TIMES DAILY PRN
Qty: 118 ML | Refills: 0 | Status: SHIPPED | OUTPATIENT
Start: 2020-10-23 | End: 2021-02-03 | Stop reason: ALTCHOICE

## 2020-12-09 ENCOUNTER — TELEPHONE (OUTPATIENT)
Dept: FAMILY MEDICINE CLINIC | Facility: CLINIC | Age: 25
End: 2020-12-09

## 2021-01-29 NOTE — PROGRESS NOTES
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  I recommended diet modifications as well and low sodium diet  Will continue to monitor and at next visit if continues elevated will consider starting on ACE inhibitor  Diagnoses and all orders for this visit:    STD exposure  -     HIV 1/2 AG-AB combo; Future  -     RPR; Future  -     Chlamydia/GC amplified DNA by PCR; Future  -     Hepatitis panel, acute; Future    Dysuria  -     POCT urine dip    Elevated blood pressure reading without diagnosis of hypertension          Subjective:      Patient ID: Harley Cox is a 25 y o  male  Patient presents with:  Exposure to STD    25year old male patient here for penile discharge  States he recently seperated with his wife due to infidelity  Has been having penile discharge for about 1 5 week  Denies any penile pain or tesiticular pain  States his ex wife has not told him that she is having any symptoms  Exposure to STD   The patient's primary symptoms include penile discharge  The patient's pertinent negatives include no genital injury, genital itching, genital lesions, pelvic pain, penile pain, priapism, scrotal swelling or testicular pain  Primary symptoms comment: non-odorous discharge/non-color  This is a new problem  The current episode started in the past 7 days  The problem occurs constantly  The problem has been unchanged  The patient is experiencing no pain  Associated symptoms include dysuria  Pertinent negatives include no abdominal pain, chills, constipation, diarrhea, discolored urine, fever, flank pain, frequency, headaches, hematuria, nausea, urgency, urinary retention or vomiting  The penile discharge was clear  Nothing aggravates the symptoms  He has tried nothing for the symptoms  He is sexually active  He never uses condoms  It is possible that his partner has an STD  There is no history of BPH, chlamydia, cryptorchidism, erectile aid use, erectile dysfunction, a femoral hernia, gonorrhea, herpes simplex, HIV, an inguinal hernia, kidney stones, prostatitis or syphilis  The following portions of the patient's history were reviewed and updated as appropriate: allergies, current medications, past family history, past medical history, past social history, past surgical history and problem list     Review of Systems   Constitutional: Negative  Negative for chills and fever  HENT: Negative  Eyes: Negative  Respiratory: Negative  Cardiovascular: Negative  Gastrointestinal: Negative  Negative for abdominal pain, constipation, diarrhea, nausea and vomiting  Endocrine: Negative  Genitourinary: Positive for discharge and dysuria  Negative for flank pain, frequency, genital sores, pelvic pain, penile pain, scrotal swelling, testicular pain and urgency  Musculoskeletal: Negative  Skin: Negative  Allergic/Immunologic: Negative  Neurological: Negative  Negative for headaches  Hematological: Negative  Psychiatric/Behavioral: Negative  Objective:      /100 (BP Location: Left arm, Patient Position: Sitting, Cuff Size: Standard)   Pulse 102   Temp 98 8 °F (37 1 °C) (Oral)   Resp 18   Ht 5' 10" (1 778 m)   Wt 117 kg (259 lb)   SpO2 97%   BMI 37 16 kg/m²          Physical Exam   Constitutional: He is oriented to person, place, and time  He appears well-developed and well-nourished  No distress     HENT:   Head: Normocephalic and atraumatic  Right Ear: External ear normal    Left Ear: External ear normal    Eyes: Pupils are equal, round, and reactive to light  EOM are normal    Neck: Normal range of motion  Neck supple  Cardiovascular: Normal rate, regular rhythm and normal heart sounds  Exam reveals no gallop and no friction rub  No murmur heard  Pulmonary/Chest: Effort normal and breath sounds normal  No respiratory distress  He has no wheezes  Abdominal: Soft  Bowel sounds are normal    Musculoskeletal: Normal range of motion  Lymphadenopathy:     He has no cervical adenopathy  Neurological: He is alert and oriented to person, place, and time  Skin: Skin is warm and dry  He is not diaphoretic  Psychiatric: He has a normal mood and affect  Thought content normal    Nursing note and vitals reviewed  [Follow-Up Visit] : a follow-up visit for [Back Pain] : back pain [Radiculopathy] : radiculopathy

## 2021-03-24 ENCOUNTER — OFFICE VISIT (OUTPATIENT)
Dept: FAMILY MEDICINE CLINIC | Facility: CLINIC | Age: 26
End: 2021-03-24

## 2021-03-24 VITALS
RESPIRATION RATE: 18 BRPM | DIASTOLIC BLOOD PRESSURE: 82 MMHG | BODY MASS INDEX: 33.93 KG/M2 | TEMPERATURE: 98 F | OXYGEN SATURATION: 97 % | SYSTOLIC BLOOD PRESSURE: 130 MMHG | HEART RATE: 90 BPM | HEIGHT: 70 IN | WEIGHT: 237 LBS

## 2021-03-24 DIAGNOSIS — E66.09 CLASS 1 OBESITY DUE TO EXCESS CALORIES WITHOUT SERIOUS COMORBIDITY WITH BODY MASS INDEX (BMI) OF 34.0 TO 34.9 IN ADULT: ICD-10-CM

## 2021-03-24 DIAGNOSIS — R53.83 OTHER FATIGUE: ICD-10-CM

## 2021-03-24 DIAGNOSIS — Z00.00 ANNUAL PHYSICAL EXAM: Primary | ICD-10-CM

## 2021-03-24 DIAGNOSIS — R73.9 HYPERGLYCEMIA: ICD-10-CM

## 2021-03-24 DIAGNOSIS — E78.2 MIXED HYPERLIPIDEMIA: ICD-10-CM

## 2021-03-24 PROBLEM — E66.811 CLASS 1 OBESITY DUE TO EXCESS CALORIES WITHOUT SERIOUS COMORBIDITY WITH BODY MASS INDEX (BMI) OF 34.0 TO 34.9 IN ADULT: Status: ACTIVE | Noted: 2021-03-24

## 2021-03-24 PROCEDURE — 3725F SCREEN DEPRESSION PERFORMED: CPT | Performed by: NURSE PRACTITIONER

## 2021-03-24 PROCEDURE — 99395 PREV VISIT EST AGE 18-39: CPT | Performed by: NURSE PRACTITIONER

## 2021-03-24 PROCEDURE — 3008F BODY MASS INDEX DOCD: CPT | Performed by: NURSE PRACTITIONER

## 2021-03-24 PROCEDURE — 4004F PT TOBACCO SCREEN RCVD TLK: CPT | Performed by: NURSE PRACTITIONER

## 2021-03-24 NOTE — PROGRESS NOTES
ADULT ANNUAL [address] [facility] PRIMARY CARE [facility]    NAME: Joselin Stein  AGE: 22 y o  SEX: male  : 1995     DATE: 3/24/2021     Assessment and Plan:     Problem List Items Addressed This Visit        Other    Annual physical exam - Primary     Patient recommended healthy eating habits  Health risk assessment was discussed with patient also and the ways to stay healthier  Recommended a exercising frequently at least 5 days a week for 30 minutes at a time; such as joining a gym if not already enrolled or walking  Eating low-fat and low-cholesterol foods with avoidance of saturated fats or fried foods  Immunizations, and the need to comply with current CDC's recommendations were discussed  To follow up yearly for physical exams  Other fatigue    Relevant Orders    CBC and differential    TSH, 3rd generation with Free T4 reflex    Hyperglycemia    Relevant Orders    Comprehensive metabolic panel    Hemoglobin A1C    Mixed hyperlipidemia    Relevant Orders    Lipid panel    Class 1 obesity due to excess calories without serious comorbidity with body mass index (BMI) of 34 0 to 34 9 in adult          Immunizations and preventive care screenings were discussed with patient today  Appropriate education was printed on patient's after visit summary  Counseling:  Alcohol/drug use: discussed moderation in alcohol intake, the recommendations for healthy alcohol use, and avoidance of illicit drug use  Dental Health: discussed importance of regular tooth brushing, flossing, and dental visits  Injury prevention: discussed safety/seat belts, safety helmets, smoke detectors, carbon dioxide detectors, and smoking near bedding or upholstery  Sexual health: discussed sexually transmitted diseases, partner selection, use of condoms, avoidance of unintended pregnancy, and contraceptive alternatives    · Exercise: the importance of regular exercise/physical activity was discussed  Recommend exercise 3-5 times per week for at least 30 minutes  BMI Counseling: Body mass index is 34 01 kg/m²  The BMI is above normal  Nutrition recommendations include encouraging healthy choices of fruits and vegetables, decreasing fast food intake, consuming healthier snacks, limiting drinks that contain sugar, increasing intake of lean protein, reducing intake of saturated and trans fat and reducing intake of cholesterol  Exercise recommendations include exercising 3-5 times per week  Depression Screening and Follow-up Plan: Patient's depression screening was positive with a PHQ-2 score of 0  Clincally patient does not have depression  No treatment is required  Tobacco Cessation Counseling: Tobacco cessation counseling was not provided  The patient is sincerely urged to quit consumption of tobacco  He is not ready to quit tobacco        No follow-ups on file  Chief Complaint:     Chief Complaint   Patient presents with    Physical Exam      History of Present Illness:     Adult Annual Physical   Patient here for a comprehensive physical exam  The patient reports no problems  Diet and Physical Activity  · Diet/Nutrition: poor diet and frequent junk food  · Exercise: no formal exercise  Depression Screening  PHQ-9 Depression Screening    PHQ-9:   Frequency of the following problems over the past two weeks:      Little interest or pleasure in doing things: 0 - not at all  Feeling down, depressed, or hopeless: 0 - not at all  PHQ-2 Score: 0       General Health  · Sleep: gets 4-6 hours of sleep on average  · Hearing: normal - bilateral   · Vision: most recent eye exam >1 year ago  · Dental: regular dental visits and brushes teeth once daily   Health  · History of STDs?: no      Review of Systems:     Review of Systems   Constitutional: Negative  Negative for appetite change, fatigue and fever  HENT: Negative    Negative for congestion, ear pain, postnasal drip, rhinorrhea, sore throat and voice change  Eyes: Negative  Respiratory: Negative  Negative for cough, chest tightness, shortness of breath and wheezing  Cardiovascular: Negative  Negative for chest pain and palpitations  Gastrointestinal: Negative  Negative for abdominal distention  Endocrine: Negative  Genitourinary: Negative  Negative for difficulty urinating  Musculoskeletal: Positive for arthralgias  Skin: Negative  Negative for color change and rash  Allergic/Immunologic: Negative  Neurological: Negative  Negative for dizziness and headaches  Hematological: Negative  Does not bruise/bleed easily  Psychiatric/Behavioral: Negative  Past Medical History:     Past Medical History:   Diagnosis Date    Asthma     in the past    Chlamydia     Hand laceration       Past Surgical History:     Past Surgical History:   Procedure Laterality Date    HAND SURGERY      HAND TENDON SURGERY Right 8/21/2019    Procedure: REPAIR FLEXOR TENDONS/NERVES HAND W/MICROSCOPE;  Surgeon: Tess Rose MD;  Location: [address] [place] [address];  Service: Plastics    NO PAST SURGERIES        Social History:        Social History     Socioeconomic History    Marital status: Single     Spouse name: None    Number of children: None    Years of education: None    Highest education level: None   Occupational History    None   Social Needs    Financial resource strain: None    Food insecurity     Worry: None     Inability: None    Transportation needs     Medical: None     Non-medical: None   Tobacco Use    Smoking status: Current Every Day Smoker    Smokeless tobacco: Never Used   Substance and Sexual Activity    Alcohol use:  Yes     Alcohol/week: 24 0 standard drinks     Types: 24 Cans of beer per week     Frequency: Monthly or less     Comment: social    Drug use: No    Sexual activity: Yes     Partners: Female     Birth control/protection: None   Lifestyle  Physical activity     Days per week: None     Minutes per session: None    Stress: None   Relationships    Social connections     Talks on phone: None     Gets together: None     Attends Orthodox service: None     Active member of club or organization: None     Attends meetings of clubs or organizations: None     Relationship status: None    Intimate partner violence     Fear of current or ex partner: None     Emotionally abused: None     Physically abused: None     Forced sexual activity: None   Other Topics Concern    None   Social History Narrative    None      Family History:     History reviewed  No pertinent family history  Current Medications:     No current outpatient medications on file  No current facility-administered medications for this visit  Allergies:     No Known Allergies   Physical Exam:     /82 (BP Location: Right arm, Patient Position: Sitting, Cuff Size: Standard)   Pulse 90   Temp 98 °F (36 7 °C) (Temporal)   Resp 18   Ht 5' 10" (1 778 m)   Wt 108 kg (237 lb)   SpO2 97%   BMI 34 01 kg/m²     Physical Exam  Vitals signs and nursing note reviewed  Constitutional:       General: He is not in acute distress  Appearance: Normal appearance  He is obese  He is not ill-appearing, toxic-appearing or diaphoretic  HENT:      Head: Normocephalic and atraumatic  Right Ear: Tympanic membrane, ear canal and external ear normal  There is no impacted cerumen  Left Ear: Tympanic membrane, ear canal and external ear normal  There is no impacted cerumen  Nose: Nose normal  No congestion or rhinorrhea  Mouth/Throat:      Mouth: Mucous membranes are moist       Pharynx: Oropharynx is clear  No oropharyngeal exudate or posterior oropharyngeal erythema  Eyes:      Extraocular Movements: Extraocular movements intact  Conjunctiva/sclera: Conjunctivae normal       Pupils: Pupils are equal, round, and reactive to light     Neck:      Musculoskeletal: Normal range of motion and neck supple  Cardiovascular:      Rate and Rhythm: Normal rate and regular rhythm  Pulses: Normal pulses  Heart sounds: Normal heart sounds  Pulmonary:      Effort: Pulmonary effort is normal       Breath sounds: Normal breath sounds  Abdominal:      General: Bowel sounds are normal       Palpations: Abdomen is soft  Musculoskeletal: Normal range of motion  General: No tenderness or deformity  Skin:     General: Skin is warm and dry  Capillary Refill: Capillary refill takes less than 2 seconds  Neurological:      General: No focal deficit present  Mental Status: He is alert and oriented to person, place, and time     Psychiatric:         Mood and Affect: Mood normal          Behavior: Behavior normal           [place] [facility], [address]

## 2021-03-24 NOTE — PATIENT INSTRUCTIONS

## 2023-01-06 ENCOUNTER — HOSPITAL ENCOUNTER (EMERGENCY)
Facility: HOSPITAL | Age: 28
Discharge: HOME | End: 2023-01-06
Attending: EMERGENCY MEDICINE

## 2023-01-06 VITALS
TEMPERATURE: 97.9 F | DIASTOLIC BLOOD PRESSURE: 96 MMHG | SYSTOLIC BLOOD PRESSURE: 153 MMHG | RESPIRATION RATE: 16 BRPM | BODY MASS INDEX: 37.87 KG/M2 | HEART RATE: 94 BPM | OXYGEN SATURATION: 98 % | WEIGHT: 263.9 LBS

## 2023-01-06 DIAGNOSIS — H66.91 RIGHT OTITIS MEDIA, UNSPECIFIED OTITIS MEDIA TYPE: Primary | ICD-10-CM

## 2023-01-06 RX ORDER — AMOXICILLIN 500 MG/1
500 CAPSULE ORAL EVERY 12 HOURS SCHEDULED
Qty: 21 CAPSULE | Refills: 0 | Status: SHIPPED | OUTPATIENT
Start: 2023-01-06 | End: 2023-01-13

## 2023-01-06 NOTE — ED PROVIDER NOTES
History  Chief Complaint   Patient presents with   • Earache     R ear pain and clogged feeling x3 weeks     HPI  Patient is a 49-year-old male with past medical history of asthma presenting today with right ear pain and clogged feeling  Patient reports that symptoms have been ongoing for 3 weeks progressively worsening  He now rates primarily a clogged feeling in his right ear as well as mild congestion  Symptoms began with a sore throat congestion and shortly that he began developing ear pain  He denies any history of recurrent ear infections  He does report a mild amount of left-sided sinus pain as well  Denies any recent fevers or chills nausea vomiting diarrhea constipation  Denies any shortness of breath change  Has otherwise been in his usual state of health  None       Past Medical History:   Diagnosis Date   • Asthma     in the past   • Chlamydia    • Hand laceration        Past Surgical History:   Procedure Laterality Date   • HAND SURGERY     • HAND TENDON SURGERY Right 8/21/2019    Procedure: REPAIR FLEXOR TENDONS/NERVES HAND W/MICROSCOPE;  Surgeon: Jennifer Zendejas MD;  Location: [address] [place] [address];  Service: Plastics   • NO PAST SURGERIES         History reviewed  No pertinent family history  I have reviewed and agree with the history as documented  E-Cigarette/Vaping   • E-Cigarette Use Never User      E-Cigarette/Vaping Substances     Social History     Tobacco Use   • Smoking status: Every Day   • Smokeless tobacco: Never   Vaping Use   • Vaping Use: Never used   Substance Use Topics   • Alcohol use: Yes     Alcohol/week: 24 0 standard drinks     Types: 24 Cans of beer per week     Comment: social   • Drug use: No       Review of Systems   Constitutional: Negative for chills and fever  HENT: Positive for ear pain, hearing loss (R ear), rhinorrhea, sinus pressure and sore throat (resolved)  Negative for congestion, ear discharge and facial swelling      Eyes: Negative for redness and visual disturbance  Respiratory: Negative for cough and shortness of breath  Cardiovascular: Negative for chest pain and palpitations  Gastrointestinal: Negative for constipation, diarrhea, nausea and vomiting  Genitourinary: Negative for dysuria and hematuria  Musculoskeletal: Negative for myalgias and neck pain  Skin: Negative for rash and wound  Allergic/Immunologic: Negative for immunocompromised state  Neurological: Negative for seizures and syncope  Psychiatric/Behavioral: Negative for confusion and suicidal ideas  Physical Exam  Physical Exam  Vitals and nursing note reviewed  Constitutional:       General: He is not in acute distress  Appearance: Normal appearance  He is well-developed  HENT:      Head: Normocephalic and atraumatic  No raccoon eyes  Jaw: There is normal jaw occlusion  Right Ear: External ear normal  A middle ear effusion is present  There is no impacted cerumen  Tympanic membrane is erythematous and bulging  Left Ear: External ear normal   No middle ear effusion  There is no impacted cerumen  Tympanic membrane is erythematous  Tympanic membrane is not bulging  Nose: Nose normal  No nasal deformity or congestion  Right Nostril: No foreign body  Left Nostril: No foreign body  Right Turbinates: Not enlarged  Left Turbinates: Not enlarged  Mouth/Throat:      Lips: Pink  Mouth: Mucous membranes are moist       Dentition: Normal dentition  Pharynx: Oropharynx is clear  Uvula midline  No oropharyngeal exudate  Eyes:      General: Lids are normal  No scleral icterus  Extraocular Movements: Extraocular movements intact  Cardiovascular:      Rate and Rhythm: Normal rate and regular rhythm  Heart sounds: No murmur heard  No friction rub  Pulmonary:      Effort: Pulmonary effort is normal  No respiratory distress  Breath sounds: No wheezing or rhonchi  Abdominal:      General: Abdomen is flat  Palpations: Abdomen is soft  Tenderness: There is no abdominal tenderness  There is no guarding or rebound  Musculoskeletal:      Cervical back: Normal range of motion  No torticollis  Skin:     General: Skin is warm and dry  Coloration: Skin is not jaundiced  Findings: No rash  Neurological:      Mental Status: He is alert and oriented to person, place, and time  Mental status is at baseline  Psychiatric:         Behavior: Behavior normal  Behavior is cooperative  Vital Signs  ED Triage Vitals [01/06/23 1547]   Temperature Pulse Respirations Blood Pressure SpO2   97 9 °F (36 6 °C) 94 16 153/96 98 %      Temp Source Heart Rate Source Patient Position - Orthostatic VS BP Location FiO2 (%)   Oral Monitor Sitting Left arm --      Pain Score       --           Vitals:    01/06/23 1547   BP: 153/96   Pulse: 94   Patient Position - Orthostatic VS: Sitting         Visual Acuity      ED Medications  Medications - No data to display    Diagnostic Studies  Results Reviewed     None                 No orders to display              Procedures  Procedures         ED Course                                             MDM  Patient on arrival is ambulatory to room is in no acute distress, vital signs stable, afebrile  On exam lungs clear auscultation, heart without murmurs rubs or gallops abdomen soft nontender  Overall reassuring physical exam he does have some left-sided sinus pressure to palpation  He has his right ear is erythematous tympanic membrane with some serous appearing fluid behind membrane with slight bulging  We will treat empirically with antibiotics  He does have follow-up appoint with his primary care physician later this month  Will discharge in stable condition return precautions discussed    Past notes reviewed   disposition  Final diagnoses:   Right otitis media, unspecified otitis media type     Time reflects when diagnosis was documented in both MDM as applicable and the Disposition within this note     Time User Action Codes Description Comment    1/6/2023  3:52 PM JelaniPelonAva Silva Add [[id number]] Right otitis media, unspecified otitis media type       ED Disposition     ED Disposition   Discharge    Condition   Stable    Date/Time   Fri Jan 6, 2023  3:52 PM    [address] discharge to home/self care  Follow-up Information     Follow up With Specialties Details Why Contact Info    ALEJANDRO Villeda Nurse Practitioner   [address] [place] [address]  [address]  49  [phone]  [phone]            Patient's Medications    No medications on file       No discharge procedures on file      PDMP Review     None          ED Provider  Electronically Signed by           Ingrid Patino MD  01/06/23 7627

## 2024-02-21 PROBLEM — R05.9 COUGH: Status: RESOLVED | Noted: 2020-10-23 | Resolved: 2024-02-21

## 2025-05-12 ENCOUNTER — APPOINTMENT (OUTPATIENT)
Dept: URGENT CARE | Age: 30
End: 2025-05-12

## (undated) DEVICE — SCD SEQUENTIAL COMPRESSION COMFORT SLEEVE MEDIUM KNEE LENGTH: Brand: KENDALL SCD

## (undated) DEVICE — STERILE POLYISOPRENE POWDER-FREE SURGICAL GLOVES: Brand: PROTEXIS

## (undated) DEVICE — 4-PORT MANIFOLD: Brand: NEPTUNE 2

## (undated) DEVICE — DRAPE OPMIDRAPE PLASTIC SCOPE

## (undated) DEVICE — WIPES INSTRUMENT EYE DRAIN

## (undated) DEVICE — NEEDLE 25GA X 1 IN SAFETY GLIDE

## (undated) DEVICE — TUBING SUCTION 5MM X 12 FT

## (undated) DEVICE — Device

## (undated) DEVICE — ACE WRAP 4 IN STERILE

## (undated) DEVICE — TONGUE DEPRESSOR STERILE

## (undated) DEVICE — MAYO STAND COVER: Brand: CONVERTORS

## (undated) DEVICE — SUT ETHILON 4-0 P-S 18 IN 699H

## (undated) DEVICE — BLADE MICRO SHARP 5.0MM X 15 DEG

## (undated) DEVICE — BETHLEHEM UNIVERSAL  MIONR EXT: Brand: CARDINAL HEALTH

## (undated) DEVICE — GAUZE SPONGES,16 PLY: Brand: CURITY

## (undated) DEVICE — ASTOUND STANDARD SURGICAL GOWN, XL: Brand: CONVERTORS

## (undated) DEVICE — OCCLUSIVE GAUZE STRIP,3% BISMUTH TRIBROMOPHENATE IN PETROLATUM BLEND: Brand: XEROFORM

## (undated) DEVICE — STERILE POLYISOPRENE POWDER-FREE SURGICAL GLOVES WITH EMOLLIENT COATING: Brand: PROTEXIS

## (undated) DEVICE — CUFF TOURNIQUET DISP SZ18

## (undated) DEVICE — DRM4 10-0 BK MONO NYL 4"/10 CM: Brand: DRM4 10-0 BK MONO NYL 4"/10 CM

## (undated) DEVICE — INTENDED FOR TISSUE SEPARATION, AND OTHER PROCEDURES THAT REQUIRE A SHARP SURGICAL BLADE TO PUNCTURE OR CUT.: Brand: BARD-PARKER SAFETY BLADES SIZE 15, STERILE

## (undated) DEVICE — CABLE BIPOLAR DISP MEGADYNE

## (undated) DEVICE — ABDOMINAL PAD: Brand: DERMACEA

## (undated) DEVICE — STOCKINETTE 2P PREROLLD 6X60

## (undated) DEVICE — KERLIX BANDAGE ROLL: Brand: KERLIX

## (undated) DEVICE — SUT PROLENE 4-0 ON PS-5 NDL 8656G